# Patient Record
Sex: FEMALE | Race: ASIAN | Employment: FULL TIME | URBAN - METROPOLITAN AREA
[De-identification: names, ages, dates, MRNs, and addresses within clinical notes are randomized per-mention and may not be internally consistent; named-entity substitution may affect disease eponyms.]

---

## 2017-03-22 ENCOUNTER — OFFICE VISIT (OUTPATIENT)
Dept: INTERNAL MEDICINE CLINIC | Facility: CLINIC | Age: 52
End: 2017-03-22

## 2017-03-22 VITALS
HEART RATE: 60 BPM | BODY MASS INDEX: 28.16 KG/M2 | HEIGHT: 62 IN | TEMPERATURE: 98 F | SYSTOLIC BLOOD PRESSURE: 136 MMHG | DIASTOLIC BLOOD PRESSURE: 87 MMHG | WEIGHT: 153 LBS

## 2017-03-22 DIAGNOSIS — Z12.31 VISIT FOR SCREENING MAMMOGRAM: ICD-10-CM

## 2017-03-22 DIAGNOSIS — Z00.00 ROUTINE GENERAL MEDICAL EXAMINATION AT A HEALTH CARE FACILITY: Primary | ICD-10-CM

## 2017-03-22 DIAGNOSIS — L60.0 NAIL INGROWING: ICD-10-CM

## 2017-03-22 DIAGNOSIS — R32 URINARY INCONTINENCE, UNSPECIFIED TYPE: ICD-10-CM

## 2017-03-22 DIAGNOSIS — Z12.11 SCREENING FOR COLON CANCER: ICD-10-CM

## 2017-03-22 PROCEDURE — 99396 PREV VISIT EST AGE 40-64: CPT | Performed by: INTERNAL MEDICINE

## 2017-03-27 NOTE — PROGRESS NOTES
HPI:    Patient ID: Lizeth Morrison is a 46year old female. HPI      Phyisical exam  Last pap done in October 2016 per patient by another Doctor.  Last mammogram done 10/14/16    /87 mmHg  Pulse 60  Temp(Src) 97.8 °F (36.6 °C) (Oral)  Ht 5' 2\" (1 Does not bruise/bleed easily. Psychiatric/Behavioral: Negative for behavioral problems and agitation. No current outpatient prescriptions on file.   Allergies:No Known Allergies    HISTORY:  Past Medical History   Diagnosis Date   • Hypothyroid ASSESSMENT/PLAN:   (Z00.00) Routine general medical examination at a health care facility  (primary encounter diagnosis)  Plan: PODIATRY - INTERNAL, ASSAY, THYROID STIM         HORMONE, CBC, PLATELET; NO DIFFERENTIAL, COMP         METABOLIC PANEL (14), F

## 2017-03-29 ENCOUNTER — OFFICE VISIT (OUTPATIENT)
Dept: PODIATRY CLINIC | Facility: CLINIC | Age: 52
End: 2017-03-29

## 2017-03-29 DIAGNOSIS — L60.0 INGROWN LEFT BIG TOENAIL: Primary | ICD-10-CM

## 2017-03-29 PROCEDURE — 99212 OFFICE O/P EST SF 10 MIN: CPT | Performed by: PODIATRIST

## 2017-03-29 PROCEDURE — 99242 OFF/OP CONSLTJ NEW/EST SF 20: CPT | Performed by: PODIATRIST

## 2017-03-29 NOTE — PROGRESS NOTES
HPI:    Patient ID: Jensen Gooden is a 46year old female. HPI  This pleasant 49-year-old female presents as a new patient to me on consult from . Patient's chief concern is ingrown toenail on the left hallux.   Patient states for a week or

## 2017-07-03 ENCOUNTER — APPOINTMENT (OUTPATIENT)
Dept: OCCUPATIONAL MEDICINE | Age: 52
End: 2017-07-03
Attending: EMERGENCY MEDICINE

## 2017-08-28 ENCOUNTER — APPOINTMENT (OUTPATIENT)
Dept: OCCUPATIONAL MEDICINE | Age: 52
End: 2017-08-28
Attending: EMERGENCY MEDICINE

## 2017-09-27 ENCOUNTER — OFFICE VISIT (OUTPATIENT)
Dept: INTERNAL MEDICINE CLINIC | Facility: CLINIC | Age: 52
End: 2017-09-27

## 2017-09-27 VITALS
DIASTOLIC BLOOD PRESSURE: 69 MMHG | HEART RATE: 65 BPM | BODY MASS INDEX: 28.16 KG/M2 | WEIGHT: 153 LBS | HEIGHT: 62 IN | SYSTOLIC BLOOD PRESSURE: 113 MMHG

## 2017-09-27 DIAGNOSIS — M79.672 FOOT PAIN, LEFT: Primary | ICD-10-CM

## 2017-09-27 PROCEDURE — 99212 OFFICE O/P EST SF 10 MIN: CPT | Performed by: INTERNAL MEDICINE

## 2017-09-27 PROCEDURE — 99213 OFFICE O/P EST LOW 20 MIN: CPT | Performed by: INTERNAL MEDICINE

## 2017-10-04 NOTE — PROGRESS NOTES
HPI:    Patient ID: Mariam Hernandez is a 45year old female.     HPI  Left foot  pain and burning    Request lab    /69 (BP Location: Left arm, Patient Position: Sitting, Cuff Size: adult)   Pulse 65   Ht 5' 2\" (1.575 m)   Wt 153 lb (69.4 kg)   BMI 2 (CPT=73630), PODIATRY - INTERNAL  Proper shoes with support  Rest    Prn pain meds otc  Patient voiced understanding  and agrees with plan    Pt has labs ordered 3/2017  My proceed with labs      Meds This Visit:  No prescriptions requested or ordered

## 2017-10-07 ENCOUNTER — HOSPITAL ENCOUNTER (OUTPATIENT)
Dept: GENERAL RADIOLOGY | Age: 52
Discharge: HOME | End: 2017-10-07
Attending: INTERNAL MEDICINE
Payer: MEDICAID

## 2017-10-07 ENCOUNTER — APPOINTMENT (OUTPATIENT)
Dept: LAB | Age: 52
End: 2017-10-07
Attending: INTERNAL MEDICINE
Payer: MEDICAID

## 2017-10-07 DIAGNOSIS — M79.672 FOOT PAIN, LEFT: ICD-10-CM

## 2017-10-07 DIAGNOSIS — Z00.00 ROUTINE GENERAL MEDICAL EXAMINATION AT A HEALTH CARE FACILITY: ICD-10-CM

## 2017-10-07 PROCEDURE — 73630 X-RAY EXAM OF FOOT: CPT | Performed by: INTERNAL MEDICINE

## 2017-10-07 PROCEDURE — 80053 COMPREHEN METABOLIC PANEL: CPT

## 2017-10-07 PROCEDURE — 84439 ASSAY OF FREE THYROXINE: CPT

## 2017-10-07 PROCEDURE — 80061 LIPID PANEL: CPT

## 2017-10-07 PROCEDURE — 85027 COMPLETE CBC AUTOMATED: CPT

## 2017-10-07 PROCEDURE — 36415 COLL VENOUS BLD VENIPUNCTURE: CPT

## 2017-10-07 PROCEDURE — 81003 URINALYSIS AUTO W/O SCOPE: CPT

## 2017-10-07 PROCEDURE — 84443 ASSAY THYROID STIM HORMONE: CPT

## 2017-10-11 DIAGNOSIS — E03.8 OTHER SPECIFIED HYPOTHYROIDISM: Primary | ICD-10-CM

## 2018-09-12 ENCOUNTER — LAB ENCOUNTER (OUTPATIENT)
Dept: LAB | Facility: HOSPITAL | Age: 53
End: 2018-09-12
Attending: PHYSICIAN ASSISTANT
Payer: COMMERCIAL

## 2018-09-12 ENCOUNTER — OFFICE VISIT (OUTPATIENT)
Dept: INTERNAL MEDICINE CLINIC | Facility: CLINIC | Age: 53
End: 2018-09-12

## 2018-09-12 VITALS
BODY MASS INDEX: 28.65 KG/M2 | DIASTOLIC BLOOD PRESSURE: 74 MMHG | HEIGHT: 62 IN | HEART RATE: 66 BPM | SYSTOLIC BLOOD PRESSURE: 126 MMHG | WEIGHT: 155.69 LBS

## 2018-09-12 DIAGNOSIS — R79.89 ABNORMAL TSH: ICD-10-CM

## 2018-09-12 DIAGNOSIS — N39.3 STRESS INCONTINENCE OF URINE: ICD-10-CM

## 2018-09-12 DIAGNOSIS — Z00.00 ROUTINE PHYSICAL EXAMINATION: Primary | ICD-10-CM

## 2018-09-12 DIAGNOSIS — Z12.31 VISIT FOR SCREENING MAMMOGRAM: ICD-10-CM

## 2018-09-12 DIAGNOSIS — Z12.11 COLON CANCER SCREENING: ICD-10-CM

## 2018-09-12 DIAGNOSIS — Z00.00 ROUTINE PHYSICAL EXAMINATION: ICD-10-CM

## 2018-09-12 LAB
ALBUMIN SERPL BCP-MCNC: 4 G/DL (ref 3.5–4.8)
ALBUMIN/GLOB SERPL: 1.1 {RATIO} (ref 1–2)
ALP SERPL-CCNC: 47 U/L (ref 32–100)
ALT SERPL-CCNC: 35 U/L (ref 14–54)
ANION GAP SERPL CALC-SCNC: 8 MMOL/L (ref 0–18)
AST SERPL-CCNC: 34 U/L (ref 15–41)
BASOPHILS # BLD: 0 K/UL (ref 0–0.2)
BASOPHILS NFR BLD: 1 %
BILIRUB SERPL-MCNC: 0.7 MG/DL (ref 0.3–1.2)
BUN SERPL-MCNC: 10 MG/DL (ref 8–20)
BUN/CREAT SERPL: 17.2 (ref 10–20)
CALCIUM SERPL-MCNC: 9.6 MG/DL (ref 8.5–10.5)
CHLORIDE SERPL-SCNC: 103 MMOL/L (ref 95–110)
CHOLEST SERPL-MCNC: 170 MG/DL (ref 110–200)
CO2 SERPL-SCNC: 26 MMOL/L (ref 22–32)
CREAT SERPL-MCNC: 0.58 MG/DL (ref 0.5–1.5)
EOSINOPHIL # BLD: 0.2 K/UL (ref 0–0.7)
EOSINOPHIL NFR BLD: 3 %
ERYTHROCYTE [DISTWIDTH] IN BLOOD BY AUTOMATED COUNT: 12.4 % (ref 11–15)
GLOBULIN PLAS-MCNC: 3.7 G/DL (ref 2.5–3.7)
GLUCOSE SERPL-MCNC: 87 MG/DL (ref 70–99)
HCT VFR BLD AUTO: 39.7 % (ref 35–48)
HDLC SERPL-MCNC: 63 MG/DL
HGB BLD-MCNC: 13.2 G/DL (ref 12–16)
LDLC SERPL CALC-MCNC: 98 MG/DL (ref 0–99)
LYMPHOCYTES # BLD: 2.5 K/UL (ref 1–4)
LYMPHOCYTES NFR BLD: 54 %
MCH RBC QN AUTO: 29.7 PG (ref 27–32)
MCHC RBC AUTO-ENTMCNC: 33.3 G/DL (ref 32–37)
MCV RBC AUTO: 89.1 FL (ref 80–100)
MONOCYTES # BLD: 0.4 K/UL (ref 0–1)
MONOCYTES NFR BLD: 8 %
NEUTROPHILS # BLD AUTO: 1.6 K/UL (ref 1.8–7.7)
NEUTROPHILS NFR BLD: 34 %
NONHDLC SERPL-MCNC: 107 MG/DL
OSMOLALITY UR CALC.SUM OF ELEC: 282 MOSM/KG (ref 275–295)
PATIENT FASTING: YES
PLATELET # BLD AUTO: 162 K/UL (ref 140–400)
PMV BLD AUTO: 11.2 FL (ref 7.4–10.3)
POTASSIUM SERPL-SCNC: 3.5 MMOL/L (ref 3.3–5.1)
PROT SERPL-MCNC: 7.7 G/DL (ref 5.9–8.4)
RBC # BLD AUTO: 4.45 M/UL (ref 3.7–5.4)
SODIUM SERPL-SCNC: 137 MMOL/L (ref 136–144)
T3FREE SERPL-MCNC: 3.95 PG/ML (ref 2.53–4.29)
T4 FREE SERPL-MCNC: 1.24 NG/DL (ref 0.58–1.64)
TRIGL SERPL-MCNC: 46 MG/DL (ref 1–149)
TSH SERPL-ACNC: 0.05 UIU/ML (ref 0.45–5.33)
WBC # BLD AUTO: 4.6 K/UL (ref 4–11)

## 2018-09-12 PROCEDURE — 84481 FREE ASSAY (FT-3): CPT

## 2018-09-12 PROCEDURE — 36415 COLL VENOUS BLD VENIPUNCTURE: CPT

## 2018-09-12 PROCEDURE — 84439 ASSAY OF FREE THYROXINE: CPT

## 2018-09-12 PROCEDURE — 80053 COMPREHEN METABOLIC PANEL: CPT

## 2018-09-12 PROCEDURE — 84443 ASSAY THYROID STIM HORMONE: CPT

## 2018-09-12 PROCEDURE — 85025 COMPLETE CBC W/AUTO DIFF WBC: CPT

## 2018-09-12 PROCEDURE — 99396 PREV VISIT EST AGE 40-64: CPT | Performed by: PHYSICIAN ASSISTANT

## 2018-09-12 PROCEDURE — 80061 LIPID PANEL: CPT

## 2018-09-12 NOTE — PROGRESS NOTES
HPI:    Patient ID: Sinan Judge is a 52year old female. HPI   Patient presents today requesting physical exam.  Last physical exam was March 2017.   She had a full set of labs completed 10/7/17 which show blood count with WBC 3.8 but otherwise unrem Constitutional: She is oriented to person, place, and time. She appears well-developed and well-nourished. HENT:   Head: Normocephalic and atraumatic.    Right Ear: Tympanic membrane and external ear normal.   Left Ear: Tympanic membrane and external ea discussed. Likely due for Tdap vaccination and agrees to think about having this done soon. Encouraged to get annual flu vaccine. Will complete forms for work once labs resulted. - CBC WITH DIFFERENTIAL WITH PLATELET;  Future  - COMP METABOLIC PANEL (14 Imaging & Referrals:  PHYSICAL THERAPY - INTERNAL  EPI SCREENING BILAT (CPT=77067)         ET#[id number]

## 2018-12-05 ENCOUNTER — HOSPITAL ENCOUNTER (OUTPATIENT)
Dept: MAMMOGRAPHY | Age: 53
Discharge: HOME | End: 2018-12-05
Attending: PHYSICIAN ASSISTANT
Payer: COMMERCIAL

## 2018-12-05 DIAGNOSIS — Z12.31 VISIT FOR SCREENING MAMMOGRAM: ICD-10-CM

## 2018-12-05 PROCEDURE — 77067 SCR MAMMO BI INCL CAD: CPT | Performed by: PHYSICIAN ASSISTANT

## 2018-12-05 PROCEDURE — 77063 BREAST TOMOSYNTHESIS BI: CPT | Performed by: PHYSICIAN ASSISTANT

## 2019-02-05 ENCOUNTER — HOSPITAL ENCOUNTER (OUTPATIENT)
Dept: MAMMOGRAPHY | Facility: HOSPITAL | Age: 54
Discharge: HOME | End: 2019-02-05
Attending: PHYSICIAN ASSISTANT
Payer: COMMERCIAL

## 2019-02-05 ENCOUNTER — HOSPITAL ENCOUNTER (OUTPATIENT)
Dept: ULTRASOUND IMAGING | Facility: HOSPITAL | Age: 54
Discharge: HOME | End: 2019-02-05
Attending: PHYSICIAN ASSISTANT
Payer: COMMERCIAL

## 2019-02-05 DIAGNOSIS — R92.8 ABNORMAL MAMMOGRAM: ICD-10-CM

## 2019-02-05 PROCEDURE — 77065 DX MAMMO INCL CAD UNI: CPT | Performed by: PHYSICIAN ASSISTANT

## 2019-02-05 PROCEDURE — 76642 ULTRASOUND BREAST LIMITED: CPT | Performed by: PHYSICIAN ASSISTANT

## 2019-02-05 PROCEDURE — 77061 BREAST TOMOSYNTHESIS UNI: CPT | Performed by: PHYSICIAN ASSISTANT

## 2019-10-25 ENCOUNTER — TELEPHONE (OUTPATIENT)
Dept: CASE MANAGEMENT | Age: 54
End: 2019-10-25

## 2019-10-25 NOTE — TELEPHONE ENCOUNTER
Patient is due for physical, cervical and colorectal cancer screening. Left message to call back [phone].

## 2019-12-16 ENCOUNTER — OFFICE VISIT (OUTPATIENT)
Dept: INTERNAL MEDICINE CLINIC | Facility: CLINIC | Age: 54
End: 2019-12-16

## 2019-12-16 VITALS
BODY MASS INDEX: 27.97 KG/M2 | DIASTOLIC BLOOD PRESSURE: 66 MMHG | SYSTOLIC BLOOD PRESSURE: 114 MMHG | HEART RATE: 69 BPM | HEIGHT: 62 IN | WEIGHT: 152 LBS | TEMPERATURE: 98 F

## 2019-12-16 DIAGNOSIS — Z00.00 ROUTINE PHYSICAL EXAMINATION: Primary | ICD-10-CM

## 2019-12-16 DIAGNOSIS — Z12.31 VISIT FOR SCREENING MAMMOGRAM: ICD-10-CM

## 2019-12-16 DIAGNOSIS — Z12.11 COLON CANCER SCREENING: ICD-10-CM

## 2019-12-16 PROCEDURE — 99396 PREV VISIT EST AGE 40-64: CPT | Performed by: INTERNAL MEDICINE

## 2019-12-16 PROCEDURE — 90686 IIV4 VACC NO PRSV 0.5 ML IM: CPT | Performed by: INTERNAL MEDICINE

## 2019-12-16 PROCEDURE — 90471 IMMUNIZATION ADMIN: CPT | Performed by: INTERNAL MEDICINE

## 2019-12-19 ENCOUNTER — APPOINTMENT (OUTPATIENT)
Dept: LAB | Age: 54
End: 2019-12-19
Attending: INTERNAL MEDICINE
Payer: COMMERCIAL

## 2019-12-19 DIAGNOSIS — Z00.00 ROUTINE PHYSICAL EXAMINATION: ICD-10-CM

## 2019-12-19 PROCEDURE — 36415 COLL VENOUS BLD VENIPUNCTURE: CPT

## 2019-12-19 PROCEDURE — 84443 ASSAY THYROID STIM HORMONE: CPT

## 2019-12-19 PROCEDURE — 81015 MICROSCOPIC EXAM OF URINE: CPT

## 2019-12-19 PROCEDURE — 80053 COMPREHEN METABOLIC PANEL: CPT

## 2019-12-19 PROCEDURE — 85027 COMPLETE CBC AUTOMATED: CPT

## 2019-12-19 PROCEDURE — 84439 ASSAY OF FREE THYROXINE: CPT

## 2019-12-19 PROCEDURE — 84480 ASSAY TRIIODOTHYRONINE (T3): CPT

## 2019-12-19 PROCEDURE — 83036 HEMOGLOBIN GLYCOSYLATED A1C: CPT

## 2019-12-19 PROCEDURE — 80061 LIPID PANEL: CPT

## 2019-12-27 NOTE — PROGRESS NOTES
HPI:    Patient ID: Geovanny Morton is a 62year old female.     HPI     Physical exam  Generally healthy    /66 (BP Location: Right arm, Patient Position: Sitting, Cuff Size: adult)   Pulse 69   Temp 98.1 °F (36.7 °C) (Oral)   Ht 5' 2\" (1.575 m)   Wt Performed by Janes Antonio MD at [place] [address]      Family History   Problem Relation Age of Onset   • Diabetes Father       Social History: Social History    Tobacco Use      Smoking status: Never Smoker      Smokeless tobacco: Never Used    Alcohol us URINE MICROSCOPIC W REFLEX         CULTURE, TRIIODOTHYRONINE (T3) TOTAL        Routine pap advised    (Z12.31) Visit for screening mammogram  Plan: [facility] 2D+3D SCREENING BILAT         (CPT=77067/25460)        Self breast exam advised    (Z12.11) Colon c

## 2020-01-13 ENCOUNTER — OFFICE VISIT (OUTPATIENT)
Dept: INTERNAL MEDICINE CLINIC | Facility: CLINIC | Age: 55
End: 2020-01-13
Payer: COMMERCIAL

## 2020-01-13 VITALS
SYSTOLIC BLOOD PRESSURE: 131 MMHG | HEART RATE: 65 BPM | WEIGHT: 152 LBS | TEMPERATURE: 98 F | DIASTOLIC BLOOD PRESSURE: 77 MMHG | BODY MASS INDEX: 27.97 KG/M2 | HEIGHT: 62 IN

## 2020-01-13 DIAGNOSIS — E78.5 HYPERLIPIDEMIA, UNSPECIFIED HYPERLIPIDEMIA TYPE: ICD-10-CM

## 2020-01-13 DIAGNOSIS — E55.9 VITAMIN D DEFICIENCY: ICD-10-CM

## 2020-01-13 DIAGNOSIS — E05.90 HYPERTHYROIDISM: Primary | ICD-10-CM

## 2020-01-13 DIAGNOSIS — R73.01 ABNORMAL FASTING GLUCOSE: ICD-10-CM

## 2020-01-13 PROCEDURE — 99214 OFFICE O/P EST MOD 30 MIN: CPT | Performed by: INTERNAL MEDICINE

## 2020-01-13 RX ORDER — METHIMAZOLE 5 MG/1
5 TABLET ORAL 2 TIMES DAILY
Qty: 180 TABLET | Refills: 0 | Status: SHIPPED | OUTPATIENT
Start: 2020-01-13 | End: 2020-04-05

## 2020-01-13 NOTE — PATIENT INSTRUCTIONS
Component      Latest Ref Rng & Units 12/19/2019 12/19/2019           8:52 AM  8:52 AM   Glucose      70 - 99 mg/dL 114 (H)    Sodium      136 - 145 mmol/L 141    Potassium      3.5 - 5.1 mmol/L 3.8    Chloride      98 - 112 mmol/L 109    Carbon Dioxide, T

## 2020-01-25 NOTE — PROGRESS NOTES
HPI:    Patient ID: Rosita Apgar is a 62year old female.     HPI  Discuss labs she denies having any specific complaints    Denies palpitations chest pain PND orthopnea or leg edema    /77 (BP Location: Right arm, Patient Position: Sitting, Cuff Siz Allergies:No Known Allergies    HISTORY:  Past Medical History:   Diagnosis Date   • Hypothyroid    • Valvular disease       Past Surgical History:   Procedure Laterality Date   • ARTHROSCOPY LEFT KNEE W/ MEDIAL MENISCECTOMY Left 3/18/2015    Performed 89.4   MCH      26.0 - 34.0 pg  29.9   MCHC      31.0 - 37.0 g/dL  33.4   RDW      11.0 - 15.0 %  12.0   RDW-SD      35.1 - 46.3 fL  38.8   Platelet Count      181.1 - 450.0 10(3)uL  178.0   Cholesterol, Total      <200 mg/dL  174   HDL Cholesterol      4 [E]      Hemoglobin A1C      Basic Metabolic Panel (8) [E]      Vit D total      Meds This Visit:  Requested Prescriptions     Signed Prescriptions Disp Refills   • methimazole (TAPAZOLE) 5 MG Oral Tab 180 tablet 0     Sig: Take 1 tablet (5 mg total) by mo

## 2020-04-05 RX ORDER — METHIMAZOLE 5 MG/1
TABLET ORAL
Qty: 180 TABLET | Refills: 0 | Status: SHIPPED | OUTPATIENT
Start: 2020-04-05 | End: 2020-07-02

## 2020-04-30 ENCOUNTER — TELEPHONE (OUTPATIENT)
Dept: ENDOCRINOLOGY CLINIC | Facility: CLINIC | Age: 55
End: 2020-04-30

## 2020-05-13 ENCOUNTER — TELEMEDICINE (OUTPATIENT)
Dept: ENDOCRINOLOGY CLINIC | Facility: CLINIC | Age: 55
End: 2020-05-13
Payer: COMMERCIAL

## 2020-05-13 DIAGNOSIS — E05.90 SUBCLINICAL HYPERTHYROIDISM: Primary | ICD-10-CM

## 2020-05-13 PROCEDURE — 99203 OFFICE O/P NEW LOW 30 MIN: CPT | Performed by: INTERNAL MEDICINE

## 2020-05-13 NOTE — PROGRESS NOTES
Name: Lissett Croft  Date: 5/13/2020    Referring Physician: Jayla De Jesus    CC: Hyperthyroidism     HISTORY OF PRESENT ILLNESS   Lissett Croft is a 62year old female who presents for hyperthyroidism.     63 y/o F presents for initial evaluation of subcl on file      Years of education: Not on file      Highest education level: Not on file    Tobacco Use      Smoking status: Never Smoker      Smokeless tobacco: Never Used    Substance and Sexual Activity      Alcohol use: No      Drug use: No    Other Topi was taken to allow for sufficient and adequate time. This billing was spent on reviewing labs, medications, radiology tests and decision making. Appropriate medical decision-making and tests are ordered as detailed in the plan of care above.

## 2020-07-02 RX ORDER — METHIMAZOLE 5 MG/1
TABLET ORAL
Qty: 180 TABLET | Refills: 0 | Status: SHIPPED | OUTPATIENT
Start: 2020-07-02 | End: 2020-10-02

## 2020-10-02 RX ORDER — METHIMAZOLE 5 MG/1
TABLET ORAL
Qty: 180 TABLET | Refills: 0 | Status: SHIPPED | OUTPATIENT
Start: 2020-10-02 | End: 2021-06-20

## 2020-12-19 ENCOUNTER — HOSPITAL ENCOUNTER (OUTPATIENT)
Dept: GENERAL RADIOLOGY | Age: 55
Discharge: HOME | End: 2020-12-19
Attending: INTERNAL MEDICINE
Payer: COMMERCIAL

## 2020-12-19 ENCOUNTER — OFFICE VISIT (OUTPATIENT)
Dept: INTERNAL MEDICINE CLINIC | Facility: CLINIC | Age: 55
End: 2020-12-19
Payer: COMMERCIAL

## 2020-12-19 VITALS
WEIGHT: 150 LBS | BODY MASS INDEX: 27.6 KG/M2 | HEART RATE: 73 BPM | SYSTOLIC BLOOD PRESSURE: 129 MMHG | HEIGHT: 62 IN | DIASTOLIC BLOOD PRESSURE: 74 MMHG

## 2020-12-19 DIAGNOSIS — Z23 NEED FOR VACCINATION: ICD-10-CM

## 2020-12-19 DIAGNOSIS — M79.605 LEFT LEG PAIN: ICD-10-CM

## 2020-12-19 DIAGNOSIS — M79.605 LEFT LEG PAIN: Primary | ICD-10-CM

## 2020-12-19 PROCEDURE — 3074F SYST BP LT 130 MM HG: CPT | Performed by: INTERNAL MEDICINE

## 2020-12-19 PROCEDURE — 90471 IMMUNIZATION ADMIN: CPT | Performed by: INTERNAL MEDICINE

## 2020-12-19 PROCEDURE — 73502 X-RAY EXAM HIP UNI 2-3 VIEWS: CPT | Performed by: INTERNAL MEDICINE

## 2020-12-19 PROCEDURE — 90686 IIV4 VACC NO PRSV 0.5 ML IM: CPT | Performed by: INTERNAL MEDICINE

## 2020-12-19 PROCEDURE — 3078F DIAST BP <80 MM HG: CPT | Performed by: INTERNAL MEDICINE

## 2020-12-19 PROCEDURE — 99213 OFFICE O/P EST LOW 20 MIN: CPT | Performed by: INTERNAL MEDICINE

## 2020-12-19 PROCEDURE — 3008F BODY MASS INDEX DOCD: CPT | Performed by: INTERNAL MEDICINE

## 2020-12-19 PROCEDURE — 72110 X-RAY EXAM L-2 SPINE 4/>VWS: CPT | Performed by: INTERNAL MEDICINE

## 2020-12-19 RX ORDER — MELOXICAM 7.5 MG/1
7.5 TABLET ORAL DAILY
Qty: 30 TABLET | Refills: 0 | Status: SHIPPED | OUTPATIENT
Start: 2020-12-19 | End: 2021-01-11

## 2020-12-19 NOTE — PROGRESS NOTES
Patient ID: Venu Calhoun is a 60year old female.   Patient presents with:  Leg Pain: LT leg pain, difficulty walking,  pain starts from waist radiating down to foot, unable to bend down, swelling at times,  takes advil for pain, had same issue in past si education: Not on file      Highest education level: Not on file    Occupational History      Not on file    Social Needs      Financial resource strain: Not on file      Food insecurity        Worry: Not on file        Inability: Not on file      Transpor well-nourished. Cardiovascular: Normal rate, regular rhythm and normal heart sounds. Pulmonary/Chest: Effort normal and breath sounds normal. No respiratory distress. She has no wheezes. She has no rales. Abdominal: Soft.  Bowel sounds are normal. She

## 2020-12-28 ENCOUNTER — OFFICE VISIT (OUTPATIENT)
Dept: INTERNAL MEDICINE CLINIC | Facility: CLINIC | Age: 55
End: 2020-12-28
Payer: COMMERCIAL

## 2020-12-28 VITALS
WEIGHT: 150 LBS | HEIGHT: 62 IN | BODY MASS INDEX: 27.6 KG/M2 | HEART RATE: 80 BPM | DIASTOLIC BLOOD PRESSURE: 83 MMHG | SYSTOLIC BLOOD PRESSURE: 158 MMHG

## 2020-12-28 DIAGNOSIS — M48.061 NEURAL FORAMINAL STENOSIS OF LUMBAR SPINE: ICD-10-CM

## 2020-12-28 DIAGNOSIS — R03.0 ELEVATED BLOOD PRESSURE READING: ICD-10-CM

## 2020-12-28 DIAGNOSIS — M79.605 LEFT LEG PAIN: Primary | ICD-10-CM

## 2020-12-28 PROCEDURE — 3008F BODY MASS INDEX DOCD: CPT | Performed by: INTERNAL MEDICINE

## 2020-12-28 PROCEDURE — 99214 OFFICE O/P EST MOD 30 MIN: CPT | Performed by: INTERNAL MEDICINE

## 2020-12-28 PROCEDURE — 3077F SYST BP >= 140 MM HG: CPT | Performed by: INTERNAL MEDICINE

## 2020-12-28 PROCEDURE — 3079F DIAST BP 80-89 MM HG: CPT | Performed by: INTERNAL MEDICINE

## 2020-12-28 NOTE — PROGRESS NOTES
Patient ID: Blaine Marina is a 60year old female. Patient presents with:  Leg Pain: 1 week f/u        HISTORY OF PRESENT ILLNESS:   HPI  Patient presents for above. Here for follow-up of left lower leg pain.   On previous visit she was given Mobic and t Use      Smoking status: Never Smoker      Smokeless tobacco: Never Used    Substance and Sexual Activity      Alcohol use: No      Drug use: No      Sexual activity: Not on file    Lifestyle      Physical activity        Days per week: Not on file There is no rebound. Neurological: She is alert. She has normal reflexes. No sensory deficit. She exhibits abnormal muscle tone. Psychiatric: She has a normal mood and affect.  Her behavior is normal.     XR Lumbar Spine (12/19/2020):  CONCLUSION: Disc

## 2021-01-04 ENCOUNTER — TELEPHONE (OUTPATIENT)
Dept: PHYSICAL THERAPY | Facility: HOSPITAL | Age: 56
End: 2021-01-04

## 2021-01-04 ENCOUNTER — TELEPHONE (OUTPATIENT)
Dept: INTERNAL MEDICINE CLINIC | Facility: CLINIC | Age: 56
End: 2021-01-04

## 2021-01-08 ENCOUNTER — TELEPHONE (OUTPATIENT)
Dept: ADMINISTRATIVE | Age: 56
End: 2021-01-08

## 2021-01-11 DIAGNOSIS — M79.605 LEFT LEG PAIN: ICD-10-CM

## 2021-01-11 RX ORDER — MELOXICAM 7.5 MG/1
TABLET ORAL
Qty: 30 TABLET | Refills: 0 | Status: SHIPPED | OUTPATIENT
Start: 2021-01-11 | End: 2021-03-09 | Stop reason: ALTCHOICE

## 2021-01-12 ENCOUNTER — OFFICE VISIT (OUTPATIENT)
Dept: PHYSICAL THERAPY | Age: 56
End: 2021-01-12
Attending: INTERNAL MEDICINE
Payer: COMMERCIAL

## 2021-01-12 DIAGNOSIS — M79.605 LEFT LEG PAIN: ICD-10-CM

## 2021-01-12 DIAGNOSIS — M48.061 NEURAL FORAMINAL STENOSIS OF LUMBAR SPINE: ICD-10-CM

## 2021-01-12 PROCEDURE — 97110 THERAPEUTIC EXERCISES: CPT

## 2021-01-12 PROCEDURE — 97161 PT EVAL LOW COMPLEX 20 MIN: CPT

## 2021-01-12 NOTE — PROGRESS NOTES
P.T. EVALUATION:   Referring Physician: Dr. Christina Durán  Diagnosis: Left leg pain (M79.605)  Neural foraminal stenosis of lumbar spine (M99.73)     Date of Onset:  December 2020 Date of Service: 1/12/2021     PATIENT SUMMARY   Lissett Sprain is a 60year old y/o Lumbar ROM:  Flx: Min loss (pain LLE)  Ext: Min loss (NE)  Rot:R min loss (back pain), L WNL (LLE pain)  Lat flx: R min loss (NE) , L mod loss (NE)    Accessory motion:   PA assessment:  L1-L2: severe pain  L3-4: moderate pain  L5-Sl: minimal pain    Str certification required: Yes  I certify the need for these services furnished under this plan of treatment and while under my care.     X___________________________________________________ Date____________________    Certification From: 4/61/6542  To:4/12/20

## 2021-01-14 ENCOUNTER — OFFICE VISIT (OUTPATIENT)
Dept: PHYSICAL THERAPY | Age: 56
End: 2021-01-14
Attending: INTERNAL MEDICINE
Payer: COMMERCIAL

## 2021-01-14 PROCEDURE — 97110 THERAPEUTIC EXERCISES: CPT

## 2021-01-14 NOTE — TELEPHONE ENCOUNTER
Left message that HIPAA or [facility] consent is needed before requested office notes can be faxed to [address]

## 2021-01-14 NOTE — PROGRESS NOTES
Diagnosis:  Left leg pain (M79.605)  Neural foraminal stenosis of lumbar spine (M99.73)        Next MD visit: none scheduled  Fall Risk: standard         Precautions: n/a          Medication Changes since last visit?: No     Subjective: Pt reports no back

## 2021-01-18 ENCOUNTER — OFFICE VISIT (OUTPATIENT)
Dept: PHYSICAL THERAPY | Age: 56
End: 2021-01-18
Payer: COMMERCIAL

## 2021-01-18 PROCEDURE — 97110 THERAPEUTIC EXERCISES: CPT

## 2021-01-18 NOTE — PROGRESS NOTES
Diagnosis:  Left leg pain (M79.605)  Neural foraminal stenosis of lumbar spine (M99.73)        Next MD visit: none scheduled  Fall Risk: standard         Precautions: n/a          Medication Changes since last visit?: No     Subjective: Pt reports her back

## 2021-01-20 ENCOUNTER — APPOINTMENT (OUTPATIENT)
Dept: PHYSICAL THERAPY | Age: 56
End: 2021-01-20
Payer: COMMERCIAL

## 2021-01-25 ENCOUNTER — OFFICE VISIT (OUTPATIENT)
Dept: PHYSICAL THERAPY | Age: 56
End: 2021-01-25
Payer: COMMERCIAL

## 2021-01-25 PROCEDURE — 97110 THERAPEUTIC EXERCISES: CPT

## 2021-01-25 NOTE — PROGRESS NOTES
Diagnosis:  Left leg pain (M79.605)  Neural foraminal stenosis of lumbar spine (M99.73)        Next MD visit: none scheduled  Fall Risk: standard         Precautions: n/a          Medication Changes since last visit?: No     Subjective: Pt reports no curre lumbar extension 10x  - shuttle machine B knee extension/flexion with 4 bands 2x10  - standing lumbar extension SAG at wall 1x10   Manual Therapy     - prone lumbar spine PA joint mobilizations at T12-L5 spinous processes grade 2 x 6 minutes   Therapeutic

## 2021-01-26 ENCOUNTER — OFFICE VISIT (OUTPATIENT)
Dept: INTERNAL MEDICINE CLINIC | Facility: CLINIC | Age: 56
End: 2021-01-26
Payer: COMMERCIAL

## 2021-01-26 VITALS
HEART RATE: 77 BPM | BODY MASS INDEX: 28.71 KG/M2 | TEMPERATURE: 98 F | DIASTOLIC BLOOD PRESSURE: 77 MMHG | WEIGHT: 156 LBS | SYSTOLIC BLOOD PRESSURE: 133 MMHG | HEIGHT: 62 IN

## 2021-01-26 DIAGNOSIS — M79.605 LEFT LEG PAIN: Primary | ICD-10-CM

## 2021-01-26 DIAGNOSIS — M48.061 NEURAL FORAMINAL STENOSIS OF LUMBAR SPINE: ICD-10-CM

## 2021-01-26 PROCEDURE — 99213 OFFICE O/P EST LOW 20 MIN: CPT | Performed by: INTERNAL MEDICINE

## 2021-01-26 PROCEDURE — 3008F BODY MASS INDEX DOCD: CPT | Performed by: INTERNAL MEDICINE

## 2021-01-26 PROCEDURE — 3075F SYST BP GE 130 - 139MM HG: CPT | Performed by: INTERNAL MEDICINE

## 2021-01-26 PROCEDURE — 3078F DIAST BP <80 MM HG: CPT | Performed by: INTERNAL MEDICINE

## 2021-01-26 NOTE — PROGRESS NOTES
Patient ID: Tatiana Corey is a 60year old female. Patient presents with: Follow - Up: Pt reports leg pain getting better. HISTORY OF PRESENT ILLNESS:   HPI  Patient presents for above. Here for follow-up of left leg pain.   Has been doing physic Tobacco Use      Smoking status: Never Smoker      Smokeless tobacco: Never Used    Substance and Sexual Activity      Alcohol use: No      Drug use: No      Sexual activity: Not on file    Lifestyle      Physical activity        Days per week: Not on file Psychiatric: She has a normal mood and affect. Her behavior is normal.         ASSESSMENT/PLAN:   1. Left leg pain  · We will hold off on MRI per patient's request.  · Continue physical therapy. · Letter written for work restrictions.     2. Neural rich

## 2021-02-01 ENCOUNTER — OFFICE VISIT (OUTPATIENT)
Dept: PHYSICAL THERAPY | Age: 56
End: 2021-02-01
Payer: COMMERCIAL

## 2021-02-01 PROCEDURE — 97110 THERAPEUTIC EXERCISES: CPT

## 2021-02-01 NOTE — PROGRESS NOTES
Diagnosis:  Left leg pain (M79.605)  Neural foraminal stenosis of lumbar spine (M99.73)        Next MD visit: none scheduled  Fall Risk: standard         Precautions: n/a          Medication Changes since last visit?: No     Subjective: Pt reports her back extension/flexion with 4 bands 2x10  - standing lumbar extension SAG at wall 1x10  - standing B shoulder horizontal abd/add with YTB 2x10    - prone gluteal squeezes 20x 5 sec holds  - JOSELUIS 1 minute  - prone R/L hip extension 2x5 reps ea  - sidelying R/L hi

## 2021-02-02 NOTE — TELEPHONE ENCOUNTER
Called number on file, it is pt's spouse mobile number. Spouse gave me pt's phone number [phone]. Spoke to pt, made pt aware we are in need of consent either through [employer] message or completion of Hipaa.  Will send another [employer] message, pt states she bautista

## 2021-02-03 ENCOUNTER — APPOINTMENT (OUTPATIENT)
Dept: PHYSICAL THERAPY | Age: 56
End: 2021-02-03
Payer: COMMERCIAL

## 2021-02-08 ENCOUNTER — OFFICE VISIT (OUTPATIENT)
Dept: PHYSICAL THERAPY | Age: 56
End: 2021-02-08
Payer: COMMERCIAL

## 2021-02-08 PROCEDURE — 97110 THERAPEUTIC EXERCISES: CPT

## 2021-02-08 NOTE — PROGRESS NOTES
Physical Therapy Discharge Summary    Diagnosis:  Left leg pain (M79.605)  Neural foraminal stenosis of lumbar spine (M99.73)        Next MD visit: none scheduled  Fall Risk: standard         Precautions: n/a          Medication Changes since last visit?: sec holds  - standing lumbar extension 10x  - standing R/L hip abduction with RTB at ankles    with RTB - not today  - standing R/L hip extension with RTB at ankles - not today  - shuttle machine B knee extension/flexion with 4 bands 3x10  - shuttle mariela Modalities       -                Goals:  1- Pt will be I with maintenance and progression of HEP - MET  2- Pt will demo increase in lumbar ROM to WNL to ease ability for pt to bend - MET  3- Pt will report abolishment of BLE symptoms to be able to ambul

## 2021-03-09 ENCOUNTER — OFFICE VISIT (OUTPATIENT)
Dept: INTERNAL MEDICINE CLINIC | Facility: CLINIC | Age: 56
End: 2021-03-09
Payer: COMMERCIAL

## 2021-03-09 VITALS
SYSTOLIC BLOOD PRESSURE: 128 MMHG | HEART RATE: 82 BPM | WEIGHT: 156 LBS | HEIGHT: 62 IN | BODY MASS INDEX: 28.71 KG/M2 | DIASTOLIC BLOOD PRESSURE: 72 MMHG

## 2021-03-09 DIAGNOSIS — M79.605 LEFT LEG PAIN: Primary | ICD-10-CM

## 2021-03-09 DIAGNOSIS — R20.8 BURNING SENSATION OF FEET: ICD-10-CM

## 2021-03-09 PROCEDURE — 99214 OFFICE O/P EST MOD 30 MIN: CPT | Performed by: INTERNAL MEDICINE

## 2021-03-09 PROCEDURE — 3078F DIAST BP <80 MM HG: CPT | Performed by: INTERNAL MEDICINE

## 2021-03-09 PROCEDURE — 3074F SYST BP LT 130 MM HG: CPT | Performed by: INTERNAL MEDICINE

## 2021-03-09 PROCEDURE — 3008F BODY MASS INDEX DOCD: CPT | Performed by: INTERNAL MEDICINE

## 2021-03-09 NOTE — PROGRESS NOTES
Patient ID: Savanah Malave is a 60year old female. Patient presents with: Follow - Up: 6 week follow up on left leg pain, per pt pain is better       HISTORY OF PRESENT ILLNESS:   HPI  Patient presents for above. Here for multiple issues.     History of  Service: Not Asked        Blood Transfusions: Not Asked        Caffeine Concern: Yes          3 cups coffee/day        Occupational Exposure: Not Asked        Hobby Hazards: Not Asked        Sleep Concern: Not Asked        Stress Concern: N Abdominal:      General: Abdomen is flat. Bowel sounds are normal.   Musculoskeletal:        Feet:    Neurological:      General: No focal deficit present. Mental Status: She is alert. ASSESSMENT/PLAN:   1. Left leg pain  · Resolved.   ·

## 2021-06-20 RX ORDER — METHIMAZOLE 5 MG/1
5 TABLET ORAL 2 TIMES DAILY
Qty: 60 TABLET | Refills: 0 | Status: SHIPPED | OUTPATIENT
Start: 2021-06-20 | End: 2021-07-26

## 2021-07-26 RX ORDER — METHIMAZOLE 5 MG/1
TABLET ORAL
Qty: 60 TABLET | Refills: 0 | Status: SHIPPED | OUTPATIENT
Start: 2021-07-26 | End: 2021-08-12

## 2021-08-12 RX ORDER — METHIMAZOLE 5 MG/1
5 TABLET ORAL 2 TIMES DAILY
Qty: 60 TABLET | Refills: 0 | Status: SHIPPED | OUTPATIENT
Start: 2021-08-12 | End: 2021-08-13

## 2021-08-12 NOTE — TELEPHONE ENCOUNTER
Current Outpatient Medications:   •  METHIMAZOLE 5 MG Oral Tab, TAKE 1 TABLET BY MOUTH TWICE A DAY, Disp: 60 tablet, Rfl: 0

## 2021-08-13 NOTE — TELEPHONE ENCOUNTER
90 day supply      Current Outpatient Medications:   •  methimazole 5 MG Oral Tab, Take 1 tablet (5 mg total) by mouth 2 (two) times daily. , Disp: 60 tablet, Rfl: 0

## 2021-08-14 RX ORDER — METHIMAZOLE 5 MG/1
5 TABLET ORAL 2 TIMES DAILY
Qty: 180 TABLET | Refills: 1 | Status: SHIPPED | OUTPATIENT
Start: 2021-08-14

## 2021-10-25 ENCOUNTER — OFFICE VISIT (OUTPATIENT)
Dept: INTERNAL MEDICINE CLINIC | Facility: CLINIC | Age: 56
End: 2021-10-25
Payer: COMMERCIAL

## 2021-10-25 VITALS
BODY MASS INDEX: 28.89 KG/M2 | HEIGHT: 62 IN | WEIGHT: 157 LBS | OXYGEN SATURATION: 96 % | HEART RATE: 71 BPM | DIASTOLIC BLOOD PRESSURE: 78 MMHG | SYSTOLIC BLOOD PRESSURE: 122 MMHG

## 2021-10-25 DIAGNOSIS — G89.29 CHRONIC PAIN OF BOTH FEET: ICD-10-CM

## 2021-10-25 DIAGNOSIS — M79.671 CHRONIC PAIN OF BOTH FEET: ICD-10-CM

## 2021-10-25 DIAGNOSIS — M79.672 CHRONIC PAIN OF BOTH FEET: ICD-10-CM

## 2021-10-25 DIAGNOSIS — E05.90 SUBCLINICAL HYPERTHYROIDISM: ICD-10-CM

## 2021-10-25 DIAGNOSIS — M47.816 OSTEOARTHRITIS OF LUMBAR SPINE, UNSPECIFIED SPINAL OSTEOARTHRITIS COMPLICATION STATUS: ICD-10-CM

## 2021-10-25 DIAGNOSIS — Z23 NEED FOR INFLUENZA VACCINATION: ICD-10-CM

## 2021-10-25 DIAGNOSIS — Z12.11 SCREENING FOR COLON CANCER: ICD-10-CM

## 2021-10-25 DIAGNOSIS — L65.9 HAIR LOSS: ICD-10-CM

## 2021-10-25 DIAGNOSIS — R32 URINARY INCONTINENCE, UNSPECIFIED TYPE: ICD-10-CM

## 2021-10-25 DIAGNOSIS — I83.93 VARICOSE VEINS OF BOTH LOWER EXTREMITIES, UNSPECIFIED WHETHER COMPLICATED: ICD-10-CM

## 2021-10-25 DIAGNOSIS — Z12.31 ENCOUNTER FOR SCREENING MAMMOGRAM FOR MALIGNANT NEOPLASM OF BREAST: ICD-10-CM

## 2021-10-25 DIAGNOSIS — E04.9 ENLARGED THYROID: ICD-10-CM

## 2021-10-25 DIAGNOSIS — Z00.00 ANNUAL PHYSICAL EXAM: Primary | ICD-10-CM

## 2021-10-25 PROCEDURE — 3008F BODY MASS INDEX DOCD: CPT | Performed by: INTERNAL MEDICINE

## 2021-10-25 PROCEDURE — 3074F SYST BP LT 130 MM HG: CPT | Performed by: INTERNAL MEDICINE

## 2021-10-25 PROCEDURE — 99386 PREV VISIT NEW AGE 40-64: CPT | Performed by: INTERNAL MEDICINE

## 2021-10-25 PROCEDURE — 90471 IMMUNIZATION ADMIN: CPT | Performed by: INTERNAL MEDICINE

## 2021-10-25 PROCEDURE — 90686 IIV4 VACC NO PRSV 0.5 ML IM: CPT | Performed by: INTERNAL MEDICINE

## 2021-10-25 PROCEDURE — 3078F DIAST BP <80 MM HG: CPT | Performed by: INTERNAL MEDICINE

## 2021-10-25 RX ORDER — MELOXICAM 15 MG/1
15 TABLET ORAL DAILY PRN
Qty: 20 TABLET | Refills: 0 | Status: SHIPPED | OUTPATIENT
Start: 2021-10-25 | End: 2021-11-14

## 2021-10-25 NOTE — PATIENT INSTRUCTIONS
Kegel Exercises  Kegel exercises are done to help strengthen the muscles in your pelvic floor. You don’t need special clothing or equipment. They’re easy to learn and simple to do.  And if you do them right, no one can tell you’re doing them, so they can sit down. · Tighten your pelvic floor before you sneeze, get up from a chair, cough, laugh, or lift. This can help prevent urine, gas, or stool leakage. Nathan last reviewed this educational content on 8/1/2020  © 2459-3660 [employer].  A special weights that you place in your vagina may be recommended to help make your Kegels even more effective. Talk to your healthcare provider if you have trouble doing Kegel exercises.    Helpful tips  Here are some tips to follow:  · Do your Arlena Duane as of

## 2021-10-25 NOTE — PROGRESS NOTES
Blaine Marina is a 60year old female.     Chief complaint: annual physical exam       HPI:     Blaine Marina is a 60year old pleasant female who presents for annual physical exam     No chest pain no sob no abdominal pain  No diarrhea or constipation   No kg/m²   GENERAL: well developed, well nourished,in no apparent distress  SKIN: no rashes,no suspicious lesions  HEENT: atraumatic, normocephalic,ears and throat are clear  NECK: supple,no adenopathy  LUNGS: clear to auscultation  Breast : normal no lumps 20 tablet; Refill: 0  - FLULAVAL INFLUENZA VACCINE QUAD PRESERVATIVE FREE 0.5 ML    3.  Urinary incontinence, unspecified type  UA   Referral to urogyne   - CBC WITH DIFFERENTIAL WITH PLATELET  - COMP METABOLIC PANEL (14)  - FERRITIN  - LIPID PANEL  - VITAM PAP REFLEX HPV MRNA E6/E7  - GASTRO - INTERNAL  - EPI SCREENING BILAT (CPT=77067); Future  - URINALYSIS WITH CULTURE REFLEX  - US THYROID (NEO=86215); Future  - OP REFERRAL TO UROGYNECOLOGY CLINIC  - Meloxicam 15 MG Oral Tab;  Take 1 tablet (15 mg total) by 25-HYDROXY  - VITAMIN B12  - IRON AND TIBC  - ASSAY, THYROID STIM HORMONE  - FREE T4 (FREE THYROXINE)  - TRIIODOTHYRONINE (T3) TOTAL  - THINPREP TIS PAP REFLEX HPV MRNA E6/E7  - GASTRO - INTERNAL  - EPI SCREENING BILAT (CPT=77067);  Future  - URINALYSIS WIT WITH CULTURE REFLEX  - US THYROID (DFV=76514); Future  - OP REFERRAL TO UROGYNECOLOGY CLINIC  - Meloxicam 15 MG Oral Tab; Take 1 tablet (15 mg total) by mouth daily as needed for Pain. Dispense: 20 tablet;  Refill: 0  - FLULAVAL INFLUENZA VACCINE QUAD PRES

## 2021-10-26 PROBLEM — M47.816 OSTEOARTHRITIS OF LUMBAR SPINE: Status: ACTIVE | Noted: 2021-10-26

## 2021-10-26 PROBLEM — Z00.00 ANNUAL PHYSICAL EXAM: Status: ACTIVE | Noted: 2021-10-26

## 2021-10-26 PROBLEM — M79.671 CHRONIC PAIN OF BOTH FEET: Status: ACTIVE | Noted: 2021-10-26

## 2021-10-26 PROBLEM — G89.29 CHRONIC PAIN OF BOTH FEET: Status: ACTIVE | Noted: 2021-10-26

## 2021-10-26 PROBLEM — R32 URINARY INCONTINENCE: Status: ACTIVE | Noted: 2021-10-26

## 2021-10-26 PROBLEM — E05.90 SUBCLINICAL HYPERTHYROIDISM: Status: ACTIVE | Noted: 2021-10-26

## 2021-10-26 PROBLEM — I83.93 VARICOSE VEINS OF BOTH LOWER EXTREMITIES: Status: ACTIVE | Noted: 2021-10-26

## 2021-10-26 PROBLEM — M79.672 CHRONIC PAIN OF BOTH FEET: Status: ACTIVE | Noted: 2021-10-26

## 2021-10-30 ENCOUNTER — LAB ENCOUNTER (OUTPATIENT)
Dept: LAB | Age: 56
End: 2021-10-30
Attending: INTERNAL MEDICINE
Payer: COMMERCIAL

## 2021-10-30 PROCEDURE — 80053 COMPREHEN METABOLIC PANEL: CPT | Performed by: INTERNAL MEDICINE

## 2021-10-30 PROCEDURE — 87086 URINE CULTURE/COLONY COUNT: CPT | Performed by: INTERNAL MEDICINE

## 2021-10-30 PROCEDURE — 36415 COLL VENOUS BLD VENIPUNCTURE: CPT | Performed by: INTERNAL MEDICINE

## 2021-10-30 PROCEDURE — 82306 VITAMIN D 25 HYDROXY: CPT | Performed by: INTERNAL MEDICINE

## 2021-10-30 PROCEDURE — 84480 ASSAY TRIIODOTHYRONINE (T3): CPT | Performed by: INTERNAL MEDICINE

## 2021-10-30 PROCEDURE — 82607 VITAMIN B-12: CPT | Performed by: INTERNAL MEDICINE

## 2021-10-30 PROCEDURE — 80061 LIPID PANEL: CPT | Performed by: INTERNAL MEDICINE

## 2021-10-30 PROCEDURE — 82728 ASSAY OF FERRITIN: CPT | Performed by: INTERNAL MEDICINE

## 2021-10-30 PROCEDURE — 84443 ASSAY THYROID STIM HORMONE: CPT | Performed by: INTERNAL MEDICINE

## 2021-10-30 PROCEDURE — 81001 URINALYSIS AUTO W/SCOPE: CPT | Performed by: INTERNAL MEDICINE

## 2021-10-30 PROCEDURE — 84439 ASSAY OF FREE THYROXINE: CPT | Performed by: INTERNAL MEDICINE

## 2021-10-30 PROCEDURE — 83540 ASSAY OF IRON: CPT | Performed by: INTERNAL MEDICINE

## 2021-10-30 PROCEDURE — 84466 ASSAY OF TRANSFERRIN: CPT | Performed by: INTERNAL MEDICINE

## 2021-10-30 PROCEDURE — 85025 COMPLETE CBC W/AUTO DIFF WBC: CPT | Performed by: INTERNAL MEDICINE

## 2021-11-03 DIAGNOSIS — D72.819 LEUKOPENIA, UNSPECIFIED TYPE: Primary | ICD-10-CM

## 2021-12-08 ENCOUNTER — HOSPITAL ENCOUNTER (OUTPATIENT)
Dept: MAMMOGRAPHY | Age: 56
Discharge: HOME | End: 2021-12-08
Attending: INTERNAL MEDICINE
Payer: COMMERCIAL

## 2021-12-08 DIAGNOSIS — M79.671 CHRONIC PAIN OF BOTH FEET: ICD-10-CM

## 2021-12-08 DIAGNOSIS — R32 URINARY INCONTINENCE, UNSPECIFIED TYPE: ICD-10-CM

## 2021-12-08 DIAGNOSIS — G89.29 CHRONIC PAIN OF BOTH FEET: ICD-10-CM

## 2021-12-08 DIAGNOSIS — I83.93 VARICOSE VEINS OF BOTH LOWER EXTREMITIES, UNSPECIFIED WHETHER COMPLICATED: ICD-10-CM

## 2021-12-08 DIAGNOSIS — Z12.11 SCREENING FOR COLON CANCER: ICD-10-CM

## 2021-12-08 DIAGNOSIS — Z23 NEED FOR INFLUENZA VACCINATION: ICD-10-CM

## 2021-12-08 DIAGNOSIS — E05.90 SUBCLINICAL HYPERTHYROIDISM: ICD-10-CM

## 2021-12-08 DIAGNOSIS — Z00.00 ANNUAL PHYSICAL EXAM: ICD-10-CM

## 2021-12-08 DIAGNOSIS — M47.816 OSTEOARTHRITIS OF LUMBAR SPINE, UNSPECIFIED SPINAL OSTEOARTHRITIS COMPLICATION STATUS: ICD-10-CM

## 2021-12-08 DIAGNOSIS — L65.9 HAIR LOSS: ICD-10-CM

## 2021-12-08 DIAGNOSIS — Z12.31 ENCOUNTER FOR SCREENING MAMMOGRAM FOR MALIGNANT NEOPLASM OF BREAST: ICD-10-CM

## 2021-12-08 DIAGNOSIS — E04.9 ENLARGED THYROID: ICD-10-CM

## 2021-12-08 DIAGNOSIS — M79.672 CHRONIC PAIN OF BOTH FEET: ICD-10-CM

## 2021-12-08 PROCEDURE — 77067 SCR MAMMO BI INCL CAD: CPT | Performed by: INTERNAL MEDICINE

## 2021-12-08 PROCEDURE — 77063 BREAST TOMOSYNTHESIS BI: CPT | Performed by: INTERNAL MEDICINE

## 2021-12-09 ENCOUNTER — HOSPITAL ENCOUNTER (OUTPATIENT)
Dept: ULTRASOUND IMAGING | Age: 56
Discharge: HOME | End: 2021-12-09
Attending: INTERNAL MEDICINE
Payer: COMMERCIAL

## 2021-12-09 DIAGNOSIS — Z23 NEED FOR INFLUENZA VACCINATION: ICD-10-CM

## 2021-12-09 DIAGNOSIS — Z12.11 SCREENING FOR COLON CANCER: ICD-10-CM

## 2021-12-09 DIAGNOSIS — M79.671 CHRONIC PAIN OF BOTH FEET: ICD-10-CM

## 2021-12-09 DIAGNOSIS — L65.9 HAIR LOSS: ICD-10-CM

## 2021-12-09 DIAGNOSIS — Z00.00 ANNUAL PHYSICAL EXAM: ICD-10-CM

## 2021-12-09 DIAGNOSIS — E04.9 ENLARGED THYROID: ICD-10-CM

## 2021-12-09 DIAGNOSIS — M47.816 OSTEOARTHRITIS OF LUMBAR SPINE, UNSPECIFIED SPINAL OSTEOARTHRITIS COMPLICATION STATUS: ICD-10-CM

## 2021-12-09 DIAGNOSIS — G89.29 CHRONIC PAIN OF BOTH FEET: ICD-10-CM

## 2021-12-09 DIAGNOSIS — Z12.31 ENCOUNTER FOR SCREENING MAMMOGRAM FOR MALIGNANT NEOPLASM OF BREAST: ICD-10-CM

## 2021-12-09 DIAGNOSIS — R32 URINARY INCONTINENCE, UNSPECIFIED TYPE: ICD-10-CM

## 2021-12-09 DIAGNOSIS — M79.672 CHRONIC PAIN OF BOTH FEET: ICD-10-CM

## 2021-12-09 DIAGNOSIS — I83.93 VARICOSE VEINS OF BOTH LOWER EXTREMITIES, UNSPECIFIED WHETHER COMPLICATED: ICD-10-CM

## 2021-12-09 DIAGNOSIS — E05.90 SUBCLINICAL HYPERTHYROIDISM: ICD-10-CM

## 2021-12-09 PROCEDURE — 76536 US EXAM OF HEAD AND NECK: CPT | Performed by: INTERNAL MEDICINE

## 2021-12-10 DIAGNOSIS — R92.2 DENSE BREAST: Primary | ICD-10-CM

## 2021-12-14 DIAGNOSIS — E04.2 MULTIPLE THYROID NODULES: Primary | ICD-10-CM

## 2022-01-22 ENCOUNTER — LAB ENCOUNTER (OUTPATIENT)
Dept: LAB | Age: 57
End: 2022-01-22
Attending: INTERNAL MEDICINE
Payer: COMMERCIAL

## 2022-01-22 DIAGNOSIS — E04.2 MULTIPLE THYROID NODULES: ICD-10-CM

## 2022-01-23 LAB — SARS-COV-2 RNA RESP QL NAA+PROBE: NOT DETECTED

## 2022-01-25 ENCOUNTER — HOSPITAL ENCOUNTER (OUTPATIENT)
Dept: ULTRASOUND IMAGING | Facility: HOSPITAL | Age: 57
Discharge: HOME | End: 2022-01-25
Attending: INTERNAL MEDICINE
Payer: COMMERCIAL

## 2022-01-25 VITALS — DIASTOLIC BLOOD PRESSURE: 70 MMHG | HEART RATE: 66 BPM | RESPIRATION RATE: 18 BRPM | SYSTOLIC BLOOD PRESSURE: 144 MMHG

## 2022-01-25 DIAGNOSIS — E04.2 MULTIPLE THYROID NODULES: ICD-10-CM

## 2022-01-25 PROCEDURE — 88177 CYTP FNA EVAL EA ADDL: CPT | Performed by: INTERNAL MEDICINE

## 2022-01-25 PROCEDURE — 10005 FNA BX W/US GDN 1ST LES: CPT | Performed by: INTERNAL MEDICINE

## 2022-01-25 PROCEDURE — 88172 CYTP DX EVAL FNA 1ST EA SITE: CPT | Performed by: INTERNAL MEDICINE

## 2022-01-25 PROCEDURE — 10006 FNA BX W/US GDN EA ADDL: CPT | Performed by: INTERNAL MEDICINE

## 2022-01-25 PROCEDURE — 88173 CYTOPATH EVAL FNA REPORT: CPT | Performed by: INTERNAL MEDICINE

## 2022-01-25 NOTE — IMAGING NOTE
1400 Pt arrived to ultrasound room #2    1405 Scans taken by LUIS E CRUZ, ultrasound  sonographer     4877 History taken and as follows:  THYROID BX IN 2007; BENIGN. ABNORMAL US THYROID.      1409 Procedure explained questions answered.     1415 Consent veri

## 2022-02-02 ENCOUNTER — TELEPHONE (OUTPATIENT)
Dept: ENDOCRINOLOGY CLINIC | Facility: CLINIC | Age: 57
End: 2022-02-02

## 2022-02-02 NOTE — TELEPHONE ENCOUNTER
Please call patient - she has been lost to follow up for thyroid. Schedule follow up, ok for Res 24. Thanks.

## 2022-02-03 NOTE — TELEPHONE ENCOUNTER
Called to help schedule, pt's  answered and stated she will be home after 3:30. Will call again later.

## 2022-02-16 ENCOUNTER — HOSPITAL ENCOUNTER (OUTPATIENT)
Dept: ULTRASOUND IMAGING | Facility: HOSPITAL | Age: 57
Discharge: HOME | End: 2022-02-16
Attending: INTERNAL MEDICINE
Payer: COMMERCIAL

## 2022-02-16 DIAGNOSIS — R92.2 DENSE BREAST: ICD-10-CM

## 2022-02-16 PROCEDURE — 76641 ULTRASOUND BREAST COMPLETE: CPT | Performed by: INTERNAL MEDICINE

## 2022-03-20 NOTE — TELEPHONE ENCOUNTER
Per chart review, patient had annual exam OV 10/25/21 with [facility] provider. Dr. Tanika Molina to please address.

## 2022-03-21 RX ORDER — METHIMAZOLE 5 MG/1
TABLET ORAL
Qty: 180 TABLET | Refills: 1 | Status: SHIPPED | OUTPATIENT
Start: 2022-03-21

## 2022-05-17 NOTE — TELEPHONE ENCOUNTER
[facility] STD request for [address] rec'd. [facility] sent for HIPAA. 24

## 2022-06-23 LAB — AMB EXT COVID-19 RESULT: DETECTED

## 2022-06-24 ENCOUNTER — TELEPHONE (OUTPATIENT)
Dept: INTERNAL MEDICINE CLINIC | Facility: CLINIC | Age: 57
End: 2022-06-24

## 2022-06-24 ENCOUNTER — TELEMEDICINE (OUTPATIENT)
Dept: INTERNAL MEDICINE CLINIC | Facility: CLINIC | Age: 57
End: 2022-06-24
Payer: COMMERCIAL

## 2022-06-24 DIAGNOSIS — U07.1 COVID-19: Primary | ICD-10-CM

## 2022-06-24 DIAGNOSIS — R52 BODY ACHES: ICD-10-CM

## 2022-06-24 DIAGNOSIS — R50.9 FEVER, UNSPECIFIED FEVER CAUSE: ICD-10-CM

## 2022-06-24 PROCEDURE — 99213 OFFICE O/P EST LOW 20 MIN: CPT | Performed by: INTERNAL MEDICINE

## 2022-06-24 NOTE — TELEPHONE ENCOUNTER
Patient states she tested positive yesterday for Covid. Reports having a fever, cough, sore throat, body aches. Patient denies shortness of breath, chest pain, nausea, vomiting, diarrhea. Patient requesting Paxlovid. Scheduled virtual appt today at 2:15 with Dr. Janki Pitts.

## 2022-12-21 ENCOUNTER — OFFICE VISIT (OUTPATIENT)
Dept: INTERNAL MEDICINE CLINIC | Facility: CLINIC | Age: 57
End: 2022-12-21
Payer: COMMERCIAL

## 2022-12-21 VITALS
BODY MASS INDEX: 29.33 KG/M2 | WEIGHT: 159.38 LBS | HEART RATE: 62 BPM | SYSTOLIC BLOOD PRESSURE: 114 MMHG | HEIGHT: 62 IN | RESPIRATION RATE: 16 BRPM | DIASTOLIC BLOOD PRESSURE: 80 MMHG

## 2022-12-21 DIAGNOSIS — Z12.31 ENCOUNTER FOR SCREENING MAMMOGRAM FOR MALIGNANT NEOPLASM OF BREAST: ICD-10-CM

## 2022-12-21 DIAGNOSIS — Z12.11 SCREENING FOR COLON CANCER: ICD-10-CM

## 2022-12-21 DIAGNOSIS — I83.93 VARICOSE VEINS OF BOTH LOWER EXTREMITIES, UNSPECIFIED WHETHER COMPLICATED: ICD-10-CM

## 2022-12-21 DIAGNOSIS — Z00.00 PHYSICAL EXAM, ANNUAL: Primary | ICD-10-CM

## 2022-12-21 DIAGNOSIS — E05.90 SUBCLINICAL HYPERTHYROIDISM: ICD-10-CM

## 2022-12-21 PROCEDURE — 99396 PREV VISIT EST AGE 40-64: CPT | Performed by: INTERNAL MEDICINE

## 2022-12-21 PROCEDURE — 3008F BODY MASS INDEX DOCD: CPT | Performed by: INTERNAL MEDICINE

## 2022-12-21 PROCEDURE — 90686 IIV4 VACC NO PRSV 0.5 ML IM: CPT | Performed by: INTERNAL MEDICINE

## 2022-12-21 PROCEDURE — 3079F DIAST BP 80-89 MM HG: CPT | Performed by: INTERNAL MEDICINE

## 2022-12-21 PROCEDURE — 90471 IMMUNIZATION ADMIN: CPT | Performed by: INTERNAL MEDICINE

## 2022-12-21 PROCEDURE — 3074F SYST BP LT 130 MM HG: CPT | Performed by: INTERNAL MEDICINE

## 2022-12-21 RX ORDER — METHIMAZOLE 5 MG/1
5 TABLET ORAL DAILY
Qty: 180 TABLET | Refills: 1 | COMMUNITY
Start: 2022-12-21

## 2022-12-24 ENCOUNTER — LAB ENCOUNTER (OUTPATIENT)
Dept: LAB | Age: 57
End: 2022-12-24
Attending: INTERNAL MEDICINE
Payer: COMMERCIAL

## 2022-12-24 LAB
ALBUMIN SERPL-MCNC: 3.6 G/DL (ref 3.4–5)
ALBUMIN/GLOB SERPL: 0.8 {RATIO} (ref 1–2)
ALP LIVER SERPL-CCNC: 79 U/L
ALT SERPL-CCNC: 50 U/L
ANION GAP SERPL CALC-SCNC: 3 MMOL/L (ref 0–18)
AST SERPL-CCNC: 32 U/L (ref 15–37)
BILIRUB SERPL-MCNC: 0.5 MG/DL (ref 0.1–2)
BUN BLD-MCNC: 10 MG/DL (ref 7–18)
BUN/CREAT SERPL: 18.9 (ref 10–20)
CALCIUM BLD-MCNC: 9.1 MG/DL (ref 8.5–10.1)
CHLORIDE SERPL-SCNC: 106 MMOL/L (ref 98–112)
CHOLEST SERPL-MCNC: 170 MG/DL (ref ?–200)
CO2 SERPL-SCNC: 32 MMOL/L (ref 21–32)
CREAT BLD-MCNC: 0.53 MG/DL
DEPRECATED RDW RBC AUTO: 41.1 FL (ref 35.1–46.3)
ERYTHROCYTE [DISTWIDTH] IN BLOOD BY AUTOMATED COUNT: 12.4 % (ref 11–15)
EST. AVERAGE GLUCOSE BLD GHB EST-MCNC: 137 MG/DL (ref 68–126)
FASTING PATIENT LIPID ANSWER: YES
FASTING STATUS PATIENT QL REPORTED: YES
GFR SERPLBLD BASED ON 1.73 SQ M-ARVRAT: 106 ML/MIN/1.73M2 (ref 60–?)
GLOBULIN PLAS-MCNC: 4.4 G/DL (ref 2.8–4.4)
GLUCOSE BLD-MCNC: 99 MG/DL (ref 70–99)
HBA1C MFR BLD: 6.4 % (ref ?–5.7)
HCT VFR BLD AUTO: 40.2 %
HDLC SERPL-MCNC: 68 MG/DL (ref 40–59)
HGB BLD-MCNC: 13.5 G/DL
LDLC SERPL CALC-MCNC: 91 MG/DL (ref ?–100)
MCH RBC QN AUTO: 30.5 PG (ref 26–34)
MCHC RBC AUTO-ENTMCNC: 33.6 G/DL (ref 31–37)
MCV RBC AUTO: 90.7 FL
NONHDLC SERPL-MCNC: 102 MG/DL (ref ?–130)
OSMOLALITY SERPL CALC.SUM OF ELEC: 291 MOSM/KG (ref 275–295)
PLATELET # BLD AUTO: 178 10(3)UL (ref 150–450)
POTASSIUM SERPL-SCNC: 3.6 MMOL/L (ref 3.5–5.1)
PROT SERPL-MCNC: 8 G/DL (ref 6.4–8.2)
RBC # BLD AUTO: 4.43 X10(6)UL
SODIUM SERPL-SCNC: 141 MMOL/L (ref 136–145)
TRIGL SERPL-MCNC: 56 MG/DL (ref 30–149)
TSI SER-ACNC: 1.42 MIU/ML (ref 0.36–3.74)
VLDLC SERPL CALC-MCNC: 9 MG/DL (ref 0–30)
WBC # BLD AUTO: 4.1 X10(3) UL (ref 4–11)

## 2022-12-24 PROCEDURE — 80053 COMPREHEN METABOLIC PANEL: CPT | Performed by: INTERNAL MEDICINE

## 2022-12-24 PROCEDURE — 83036 HEMOGLOBIN GLYCOSYLATED A1C: CPT | Performed by: INTERNAL MEDICINE

## 2022-12-24 PROCEDURE — 80061 LIPID PANEL: CPT | Performed by: INTERNAL MEDICINE

## 2022-12-24 PROCEDURE — 85027 COMPLETE CBC AUTOMATED: CPT | Performed by: INTERNAL MEDICINE

## 2022-12-24 PROCEDURE — 84443 ASSAY THYROID STIM HORMONE: CPT | Performed by: INTERNAL MEDICINE

## 2022-12-24 PROCEDURE — 36415 COLL VENOUS BLD VENIPUNCTURE: CPT | Performed by: INTERNAL MEDICINE

## 2022-12-28 DIAGNOSIS — R73.03 BORDERLINE DIABETES: Primary | ICD-10-CM

## 2023-01-13 ENCOUNTER — HOSPITAL ENCOUNTER (OUTPATIENT)
Dept: MAMMOGRAPHY | Age: 58
Discharge: HOME | End: 2023-01-13
Attending: INTERNAL MEDICINE
Payer: COMMERCIAL

## 2023-01-13 DIAGNOSIS — Z12.31 ENCOUNTER FOR SCREENING MAMMOGRAM FOR MALIGNANT NEOPLASM OF BREAST: ICD-10-CM

## 2023-02-01 ENCOUNTER — HOSPITAL ENCOUNTER (OUTPATIENT)
Dept: MAMMOGRAPHY | Facility: HOSPITAL | Age: 58
Discharge: HOME | End: 2023-02-01
Attending: PHYSICIAN ASSISTANT
Payer: COMMERCIAL

## 2023-02-01 ENCOUNTER — HOSPITAL ENCOUNTER (OUTPATIENT)
Dept: ULTRASOUND IMAGING | Facility: HOSPITAL | Age: 58
Discharge: HOME | End: 2023-02-01
Attending: PHYSICIAN ASSISTANT
Payer: COMMERCIAL

## 2023-02-01 DIAGNOSIS — R92.8 ABNORMAL MAMMOGRAM: ICD-10-CM

## 2023-02-01 DIAGNOSIS — R92.8 ABNORMAL MAMMOGRAM: Primary | ICD-10-CM

## 2023-02-01 PROCEDURE — 77062 BREAST TOMOSYNTHESIS BI: CPT | Performed by: PHYSICIAN ASSISTANT

## 2023-02-01 PROCEDURE — 76642 ULTRASOUND BREAST LIMITED: CPT | Performed by: PHYSICIAN ASSISTANT

## 2023-02-01 PROCEDURE — 77066 DX MAMMO INCL CAD BI: CPT | Performed by: PHYSICIAN ASSISTANT

## 2023-07-05 DIAGNOSIS — E05.90 SUBCLINICAL HYPERTHYROIDISM: ICD-10-CM

## 2023-07-06 DIAGNOSIS — E05.90 SUBCLINICAL HYPERTHYROIDISM: ICD-10-CM

## 2023-07-06 RX ORDER — METHIMAZOLE 5 MG/1
5 TABLET ORAL 2 TIMES DAILY
Qty: 180 TABLET | Refills: 1 | OUTPATIENT
Start: 2023-07-06

## 2023-07-06 NOTE — TELEPHONE ENCOUNTER
Please review. Protocol failed or has no protocol. Requested Prescriptions   Pending Prescriptions Disp Refills    methIMAzole 5 MG Oral Tab 180 tablet 1     Sig: Take 1 tablet (5 mg total) by mouth daily.        Hyperthyroid Medication Protocol Failed - 7/5/2023  5:27 PM        Failed - TSH resulted in past 6 months        Failed - In person appointment or virtual visit in the past 6 mos or appointment in next 3 mos     Recent Outpatient Visits              6 months ago Physical exam, annual    Karely Murillo MD    Office Visit    1 year ago COVID-19    Cami Murillo MD    Telemedicine    1 year ago Annual physical exam    Morena Burch MD    Office Visit    2 years ago Left leg pain    Bibi Hamm, Cami Spears MD    Office Visit    2 years ago     [facility] in [facility] [address] [place]    Office Visit                           Recent Outpatient Visits              6 months ago Physical exam, annual    Karely Murillo MD    Office Visit    1 year ago COVID-19    Bibi Hamm, Cami Spears MD    Telemedicine    1 year ago Annual physical exam    Morena Burch MD    Office Visit    2 years ago Left leg pain    Cami Murillo MD    Office Visit    2 years ago     [facility] Weiner in [facility] [address] [place]    Office Visit

## 2023-07-07 RX ORDER — METHIMAZOLE 5 MG/1
5 TABLET ORAL DAILY
Qty: 180 TABLET | Refills: 1 | Status: SHIPPED | OUTPATIENT
Start: 2023-07-07

## 2023-10-20 ENCOUNTER — TELEPHONE (OUTPATIENT)
Dept: CASE MANAGEMENT | Age: 58
End: 2023-10-20

## 2023-10-20 NOTE — TELEPHONE ENCOUNTER
Dr. Omar Perez,     Patient is requesting mammogram order be faxed to [facility]. [address]  Phone: [phone]  Fax: [phone]    If you have any questions please call patient.      Thank you

## 2023-10-27 ENCOUNTER — TELEPHONE (OUTPATIENT)
Dept: INTERNAL MEDICINE CLINIC | Facility: CLINIC | Age: 58
End: 2023-10-27

## 2023-10-27 NOTE — TELEPHONE ENCOUNTER
Pt's  asking for Dr Triston Layton office to send the report of pt's previous mammogram and ultrasound to:    [facility]  P: [phone]  Fax: [phone]    They need before 10-31-23 when her appt is for her mammogram    Patient declined to ask Medical Records to send it.

## 2023-11-10 ENCOUNTER — MED REC SCAN ONLY (OUTPATIENT)
Dept: INTERNAL MEDICINE CLINIC | Facility: CLINIC | Age: 58
End: 2023-11-10

## 2023-11-13 DIAGNOSIS — N60.02 BREAST CYST, LEFT: Primary | ICD-10-CM

## 2024-04-16 ENCOUNTER — OFFICE VISIT (OUTPATIENT)
Dept: INTERNAL MEDICINE CLINIC | Facility: CLINIC | Age: 59
End: 2024-04-16

## 2024-04-16 VITALS
DIASTOLIC BLOOD PRESSURE: 85 MMHG | WEIGHT: 164 LBS | OXYGEN SATURATION: 95 % | SYSTOLIC BLOOD PRESSURE: 137 MMHG | BODY MASS INDEX: 30.18 KG/M2 | HEIGHT: 62 IN | HEART RATE: 64 BPM

## 2024-04-16 DIAGNOSIS — E04.9 GOITER: ICD-10-CM

## 2024-04-16 DIAGNOSIS — N60.02 BREAST CYST, LEFT: ICD-10-CM

## 2024-04-16 DIAGNOSIS — Z00.00 PHYSICAL EXAM, ANNUAL: Primary | ICD-10-CM

## 2024-04-16 DIAGNOSIS — Z12.11 COLON CANCER SCREENING: ICD-10-CM

## 2024-04-16 DIAGNOSIS — R73.03 BORDERLINE DIABETES: ICD-10-CM

## 2024-04-16 PROCEDURE — 96127 BRIEF EMOTIONAL/BEHAV ASSMT: CPT | Performed by: INTERNAL MEDICINE

## 2024-04-16 PROCEDURE — 99396 PREV VISIT EST AGE 40-64: CPT | Performed by: INTERNAL MEDICINE

## 2024-04-16 NOTE — PROGRESS NOTES
Lisa Banerjee is a 58 year old female.  Chief Complaint   Patient presents with    Physical       HPI:   Pt comes for her annual physical   C/c annual physical   C/o was taking care of her  who wasn't doing well   Noted she had thyroid usg 2021 and biopsy 2022   nodules as well as the thyroid itself has increased when compared to December 9, 2014.   3. It appears that the larger lesions on either side were previously biopsied in 2007.  There are new nodules bilaterally two  on the right and one on the left         HISTORY  2022   New patient  C/C annual physical   C/o leg pain and burnng in the feet   Went for PT for the left leg and it helped but this is different      PMH  Hyperthyroidism - dr velazquez   Chondromalacia knee  H/o covid 6/2022   Goiter         Lives with , works in a factory           Current Outpatient Medications   Medication Sig Dispense Refill    methIMAzole 5 MG Oral Tab Take 1 tablet (5 mg total) by mouth daily. 180 tablet 1      Past Medical History:    Chondromalacia of patella    Hypothyroid    Valvular disease      Past Surgical History:   Procedure Laterality Date    Knee scope,med/lat menisectomy Left 3/18/2015    Procedure: ARTHROSCOPY LEFT KNEE W/ MEDIAL MENISCECTOMY;  Surgeon: Jared Shields MD;  Location: [facility]      Social History:  Social History     Socioeconomic History    Marital status:    Tobacco Use    Smoking status: Never    Smokeless tobacco: Never   Vaping Use    Vaping status: Never Used   Substance and Sexual Activity    Alcohol use: No    Drug use: No   Other Topics Concern    Caffeine Concern Yes     Comment: 3 cups coffee/day        REVIEW OF SYSTEMS:   GENERAL HEALTH: No fevers, chills, sweats, fatigue  VISION: No recent vision problems, blurry vision or double vision  HEENT: No decreased hearing ear pain nasal congestion or sore throat  SKIN: denies any unusual skin lesions or rashes  RESPIRATORY: denies shortness of breath, cough,  wheezing  CARDIOVASCULAR: denies chest pain on exertion, palpitations, swelling in feet  GI: denies abdominal pain and denies heartburn, nausea or vomiting  : No Pain on urination, change in the color of urine, discharge, urinating frequently  MUS: No back pain, joint pain, muscle pain  NEURO: denies headaches , anxiety, depression    EXAM:   /85 (BP Location: Left arm, Patient Position: Sitting)   Pulse 64   Ht 5' 2\" (1.575 m)   Wt 164 lb (74.4 kg)   LMP 10/14/2013 (Approximate)   SpO2 95%   BMI 30.00 kg/m²   GENERAL: well developed, well nourished,in no apparent distress  SKIN: no rashes,no suspicious lesions  HEENT: atraumatic, normocephalic,ears and throat are clear, no frontal or maxillary sinus tenderness, pupils equal reactive to light bilaterally, extract muscles intact  NECK: supple,no adenopathy,+ goiter   LUNGS: clear to auscultation, no wheeze  CARDIO: RRR without murmur  GI: good BS's,no masses or tenderness  EXTREMITIES: no cyanosis, or edema  BREAST: Bilateral breasts without any lumps, bilateral axilla without any lymphadenopathy, no nipple retraction or  discharge    ASSESSMENT AND PLAN:   Diagnoses and all orders for this visit:    Physical exam, annual  -     Comp Metabolic Panel (14)  -     Lipid Panel  -     Assay, Thyroid Stim Hormone  -     Hemoglobin A1C  -     CBC, Platelet; No Differential  Patient to watch what she eats exercise, seatbelt use no texting driving, sunscreen use advised  Borderline diabetes  -     Comp Metabolic Panel (14)  -     Lipid Panel  -     Assay, Thyroid Stim Hormone  -     Hemoglobin A1C  -     CBC, Platelet; No Differential  Follow low-carb low sugar diet and exercise with a goal of 30 minutes a day and we will recheck labs  Breast cyst, left  -     EPI DIAGNOSTIC BILATERAL (CPT=77066); Future  Ordered-bilateral diagnostic mammogram since it has been over a year  Colon cancer screening  -     [facility] GI Telephone Colon Screen  -     Gastro Referral - In  Network  Refer  Goiter  -     US THYROID (CPT=76536); Future  Rpt usg ordered -she may go facility, fax number given            Preventive medicine  Mammogram-ordered  Pap smear October 2021 Dr. Tenorio  Colonoscopy-we will refer  Labs to be done once fasting-ordered  Flu shot today     The patient indicates understanding of these issues and agrees to the plan.  No follow-ups on file.

## 2024-04-20 ENCOUNTER — LAB ENCOUNTER (OUTPATIENT)
Dept: LAB | Age: 59
End: 2024-04-20
Attending: INTERNAL MEDICINE
Payer: COMMERCIAL

## 2024-04-20 LAB
ALBUMIN SERPL-MCNC: 4.2 G/DL (ref 3.2–4.8)
ALBUMIN/GLOB SERPL: 1.2 {RATIO} (ref 1–2)
ALP LIVER SERPL-CCNC: 77 U/L
ALT SERPL-CCNC: 83 U/L
ANION GAP SERPL CALC-SCNC: 6 MMOL/L (ref 0–18)
AST SERPL-CCNC: 66 U/L (ref ?–34)
BILIRUB SERPL-MCNC: 0.5 MG/DL (ref 0.2–1.1)
BUN BLD-MCNC: 13 MG/DL (ref 9–23)
BUN/CREAT SERPL: 19.4 (ref 10–20)
CALCIUM BLD-MCNC: 9.3 MG/DL (ref 8.7–10.4)
CHLORIDE SERPL-SCNC: 108 MMOL/L (ref 98–112)
CHOLEST SERPL-MCNC: 180 MG/DL (ref ?–200)
CO2 SERPL-SCNC: 28 MMOL/L (ref 21–32)
CREAT BLD-MCNC: 0.67 MG/DL
DEPRECATED RDW RBC AUTO: 40.9 FL (ref 35.1–46.3)
EGFRCR SERPLBLD CKD-EPI 2021: 100 ML/MIN/1.73M2 (ref 60–?)
ERYTHROCYTE [DISTWIDTH] IN BLOOD BY AUTOMATED COUNT: 12.5 % (ref 11–15)
EST. AVERAGE GLUCOSE BLD GHB EST-MCNC: 134 MG/DL (ref 68–126)
FASTING PATIENT LIPID ANSWER: YES
FASTING STATUS PATIENT QL REPORTED: YES
GLOBULIN PLAS-MCNC: 3.4 G/DL (ref 2.8–4.4)
GLUCOSE BLD-MCNC: 103 MG/DL (ref 70–99)
HBA1C MFR BLD: 6.3 % (ref ?–5.7)
HCT VFR BLD AUTO: 38.2 %
HDLC SERPL-MCNC: 60 MG/DL (ref 40–59)
HGB BLD-MCNC: 13.2 G/DL
LDLC SERPL CALC-MCNC: 109 MG/DL (ref ?–100)
MCH RBC QN AUTO: 31.1 PG (ref 26–34)
MCHC RBC AUTO-ENTMCNC: 34.6 G/DL (ref 31–37)
MCV RBC AUTO: 89.9 FL
NONHDLC SERPL-MCNC: 120 MG/DL (ref ?–130)
OSMOLALITY SERPL CALC.SUM OF ELEC: 294 MOSM/KG (ref 275–295)
PLATELET # BLD AUTO: 160 10(3)UL (ref 150–450)
POTASSIUM SERPL-SCNC: 3.8 MMOL/L (ref 3.5–5.1)
PROT SERPL-MCNC: 7.6 G/DL (ref 5.7–8.2)
RBC # BLD AUTO: 4.25 X10(6)UL
SODIUM SERPL-SCNC: 142 MMOL/L (ref 136–145)
TRIGL SERPL-MCNC: 57 MG/DL (ref 30–149)
TSI SER-ACNC: 1.29 MIU/ML (ref 0.55–4.78)
VLDLC SERPL CALC-MCNC: 10 MG/DL (ref 0–30)
WBC # BLD AUTO: 4.1 X10(3) UL (ref 4–11)

## 2024-04-20 PROCEDURE — 83036 HEMOGLOBIN GLYCOSYLATED A1C: CPT | Performed by: INTERNAL MEDICINE

## 2024-04-20 PROCEDURE — 36415 COLL VENOUS BLD VENIPUNCTURE: CPT | Performed by: INTERNAL MEDICINE

## 2024-04-20 PROCEDURE — 85027 COMPLETE CBC AUTOMATED: CPT | Performed by: INTERNAL MEDICINE

## 2024-04-20 PROCEDURE — 84443 ASSAY THYROID STIM HORMONE: CPT | Performed by: INTERNAL MEDICINE

## 2024-04-20 PROCEDURE — 80061 LIPID PANEL: CPT | Performed by: INTERNAL MEDICINE

## 2024-04-20 PROCEDURE — 80053 COMPREHEN METABOLIC PANEL: CPT | Performed by: INTERNAL MEDICINE

## 2024-04-26 DIAGNOSIS — R79.89 ELEVATED LFTS: Primary | ICD-10-CM

## 2024-06-14 ENCOUNTER — HOSPITAL ENCOUNTER (OUTPATIENT)
Dept: ULTRASOUND IMAGING | Facility: HOSPITAL | Age: 59
Discharge: HOME | End: 2024-06-14
Attending: INTERNAL MEDICINE
Payer: COMMERCIAL

## 2024-06-14 ENCOUNTER — HOSPITAL ENCOUNTER (OUTPATIENT)
Dept: MAMMOGRAPHY | Facility: HOSPITAL | Age: 59
Discharge: HOME | End: 2024-06-14
Attending: INTERNAL MEDICINE
Payer: COMMERCIAL

## 2024-06-14 DIAGNOSIS — N60.02 BREAST CYST, LEFT: ICD-10-CM

## 2024-06-14 PROCEDURE — 77062 BREAST TOMOSYNTHESIS BI: CPT | Performed by: INTERNAL MEDICINE

## 2024-06-14 PROCEDURE — 77066 DX MAMMO INCL CAD BI: CPT | Performed by: INTERNAL MEDICINE

## 2024-06-14 PROCEDURE — 76642 ULTRASOUND BREAST LIMITED: CPT | Performed by: INTERNAL MEDICINE

## 2024-07-07 DIAGNOSIS — E05.90 SUBCLINICAL HYPERTHYROIDISM: ICD-10-CM

## 2024-07-10 RX ORDER — METHIMAZOLE 5 MG/1
5 TABLET ORAL DAILY
Qty: 90 TABLET | Refills: 3 | Status: SHIPPED | OUTPATIENT
Start: 2024-07-10

## 2024-07-10 NOTE — TELEPHONE ENCOUNTER
Refill passed per [facility] protocol.    Requested Prescriptions   Pending Prescriptions Disp Refills    METHIMAZOLE 5 MG Oral Tab [Pharmacy Med Name: METHIMAZOLE 5 MG TABLET] 90 tablet 3     Sig: Take 1 tablet (5 mg total) by mouth daily.       Hyperthyroid Medication Protocol Passed - 7/7/2024  7:00 AM        Passed - In person appointment or virtual visit in the past 12 mos or appointment in next 3 mos     Recent Outpatient Visits              [facility]    1 year ago Physical exam, annual    [facility] Isha Alcala MD    Office Visit    2 years ago COVIDJason    [facility]    [facility]    2 years ago Annual physical exam    [facility], Deneen Lott MD    Office Visit    3 years ago [facility] leg pain    [facility]                      Passed - TSH resulted in past 12 months        Passed - Last TSH within normal limits     Lab Results   Component Value Date    TSH 1.293 04/20/2024                      Recent Outpatient Visits              2 months ago [facility]    1 year ago [facility]    2 years ago [facility]Jason    [facility]    2 [facility]    3 years ago Left leg pain    [facility], Julio César Figueroa MD    Office Visit

## 2024-07-17 ENCOUNTER — OFFICE VISIT (OUTPATIENT)
Dept: INTERNAL MEDICINE CLINIC | Facility: CLINIC | Age: 59
End: 2024-07-17

## 2024-07-17 ENCOUNTER — NURSE TRIAGE (OUTPATIENT)
Dept: INTERNAL MEDICINE CLINIC | Facility: CLINIC | Age: 59
End: 2024-07-17

## 2024-07-17 VITALS
HEART RATE: 72 BPM | HEIGHT: 62 IN | WEIGHT: 161 LBS | SYSTOLIC BLOOD PRESSURE: 130 MMHG | DIASTOLIC BLOOD PRESSURE: 78 MMHG | BODY MASS INDEX: 29.63 KG/M2

## 2024-07-17 DIAGNOSIS — M54.2 CERVICALGIA: Primary | ICD-10-CM

## 2024-07-17 PROCEDURE — 99214 OFFICE O/P EST MOD 30 MIN: CPT | Performed by: STUDENT IN AN ORGANIZED HEALTH CARE EDUCATION/TRAINING PROGRAM

## 2024-07-17 RX ORDER — BACLOFEN 10 MG/1
10 TABLET ORAL 3 TIMES DAILY PRN
Qty: 90 TABLET | Refills: 0 | Status: SHIPPED | OUTPATIENT
Start: 2024-07-17 | End: 2024-08-16

## 2024-07-17 RX ORDER — NAPROXEN 500 MG/1
500 TABLET ORAL 2 TIMES DAILY WITH MEALS
Qty: 60 TABLET | Refills: 0 | Status: SHIPPED | OUTPATIENT
Start: 2024-07-17 | End: 2024-08-16

## 2024-07-17 RX ORDER — METHYLPREDNISOLONE 4 MG/1
TABLET ORAL
Qty: 1 EACH | Refills: 0 | Status: SHIPPED | OUTPATIENT
Start: 2024-07-17

## 2024-07-17 NOTE — PROGRESS NOTES
OFFICE NOTE     Patient ID: Lisa Banerjee is a 58 year old female.  Today's Date: 07/17/24  Chief Complaint: Neck Pain (1.5 week neck pain/stiffness)    Pt is a 57y/o female with Pmhx of pre-diabetes, subclinical hyperthyroidism, neural foraminal stenosis of lumber spine, urinary incontinence whom presents to clinic with ongoing right sided neck pain.     Neck Pain   This is a new problem. The current episode started 1 to 4 weeks ago. The problem occurs daily. The problem has been gradually improving. The pain is associated with a sleep position. The pain is present in the right side. The quality of the pain is described as shooting and stabbing. The pain is at a severity of 6/10. The pain is moderate. The pain is Worse during the night. Pertinent negatives include no chest pain, fever, headaches, leg pain, numbness, pain with swallowing, paresis, photophobia, syncope, tingling, trouble swallowing, visual change, weakness or weight loss.         Vitals:    07/17/24 1539 07/17/24 1544   BP: 151/77 130/78   Pulse: 71 72   Weight: 161 lb (73 kg)    Height: 5' 2\" (1.575 m)      body mass index is 29.45 kg/m².  BP Readings from Last 3 Encounters:   07/17/24 130/78   04/16/24 137/85   12/21/22 114/80     The 10-year ASCVD risk score (Chente GRANADOS, et al., 2019) is: 3%    Values used to calculate the score:      Age: 58 years      Sex: Female      Is Non- : No      Diabetic: No      Tobacco smoker: No      Systolic Blood Pressure: 130 mmHg      Is BP treated: No      HDL Cholesterol: 60 mg/dL      Total Cholesterol: 180 mg/dL      Medications reviewed:  Current Outpatient Medications   Medication Sig Dispense Refill    naproxen 500 MG Oral Tab Take 1 tablet (500 mg total) by mouth 2 (two) times daily with meals. 60 tablet 0    baclofen 10 MG Oral Tab Take 1 tablet (10 mg total) by mouth 3 (three) times daily as needed. 90 tablet 0    methylPREDNISolone 4 MG Oral Tablet Therapy Pack Take  as directed with food. 1 each 0    methIMAzole 5 MG Oral Tab Take 1 tablet (5 mg total) by mouth daily. 90 tablet 3         Assessment & Plan    1. Cervicalgia (Primary)  -     Naproxen; Take 1 tablet (500 mg total) by mouth 2 (two) times daily with meals.  Dispense: 60 tablet; Refill: 0  -     Baclofen; Take 1 tablet (10 mg total) by mouth 3 (three) times daily as needed.  Dispense: 90 tablet; Refill: 0  -     methylPREDNISolone; Take as directed with food.  Dispense: 1 each; Refill: 0  Positive right Spurling test, with right neck/upper back pain  Plan:  -start medrol dose pack  -start baclofen 10mg po TID PRN  -continue NSAIDS (Ibuprofen/aleve OTC after steroids complete)  -PT referral in-network (next available/open site), if not availability next week said we could send out of network if approved by insurance.   -home exercise printed in AVS.   -Recommended to get NeovacsPActBlueNeck™ Pillow (firm: purchase from [employer] or [employer]).  -Recommended to purchase RESTCLOUD Neck and Shoulder Relaxer, Cervical Traction Device for TMJ Pain Relief and Cervical Spine Alignment, Chiropractic Pillow Neck Stretcher (on [employer])- to lie down with foam insert behind neck and ground and extend neck and lie on ground for 5-15 minutes daily for neck pain relief and neck stretches provided in AVS in the interm till seen by Physical therapy    Neck Exercises: Active Neck Rotation  To start, lie on your back, knees bent and feet flat on the floor. Keep your ears, shoulders, and hips aligned, but don’t press your lower back to the floor. Rest your hands on your pelvis. Breathe deeply and relax.   Here are the steps for the active neck rotation:  Use your neck muscles to turn your head to one side until you feel a stretch in the muscles.  Hold for  5 seconds. Then turn to the other side.  Repeat  5 times on each side.  Note: Keep your shoulders on the floor. Don’t lift or tuck your chin as you turn your head.   StayWell last  reviewed this educational content on 7/1/2020  © 6474-5703 [employer]. All rights reserved. This information is not intended as a substitute for professional medical care. Always follow your healthcare professional's instructions.        Neck Exercises: Overhead Arm Raise  To start, lie on your back, knees bent and feet flat on the floor. Keep your ears, shoulders, and hips aligned, but don’t press your lower back to the floor. Rest your hands on your pelvis. Breathe deeply and relax. Tighten the belly muscles to keep the back from arching.   Here are the steps for the arm lift:  Raise one arm overhead, then lower it. As you lower that arm, raise the other arm.  Continue to move both arms in slow, smooth arcs. Keep your arms straight and your head and neck relaxed.  Repeat  10 times with each arm.  For your safety, check with your healthcare provider before starting an exercise program.   [employer] last reviewed this educational content on 7/1/2020  © 4758-4158 [employer]. All rights reserved. This information is not intended as a substitute for professional medical care. Always follow your healthcare professional's instructions.        Neck Exercises: Head Lifts    Do this exercise on your hands and knees. Keep your knees under your hips and your hands under your shoulders. Keep your spine in a neutral position (not arched or sagging). Keep your ears in line with your shoulders. Hold for a few seconds before starting the exercise:  Keeping your back straight, slowly drop your chin toward your chest. Tuck in your chin.  Hold for 5 seconds. Then lift your head until your neck is level with your back.  Hold for 5 seconds. Repeat 5 to10 times.  Nathan last reviewed this educational content on 3/1/2018  © 1498-2964 The [employer]. All rights reserved. This information is not intended as a substitute for professional medical care. Always follow your healthcare professional's  instructions.        Neck Exercises: Neck and Torso Rotation   To start, lie on your back, knees bent and feet flat on the floor. Keep your ears, shoulders, and hips aligned, but don’t press your lower back to the floor. Breathe deeply and relax.  From starting position, drop both knees to one side. At the same time, turn your head and look in the other direction.  Keep both feet in contact with the floor and keep your arms at your sides.  Hold for 5 seconds. Then slowly switch sides.  Repeat 5 to 10 times.  LYFE Kitchen last reviewed this educational content on 3/1/2018  © 7995-5303 [employer]. All rights reserved. This information is not intended as a substitute for professional medical care. Always follow your healthcare professional's instructions.        Neck Exercises: Neck Flex  To start, sit in a chair with your feet flat on the floor. Your weight should be slightly forward so that you’re balanced evenly on your buttocks. Relax your shoulders and keep your head level. Avoid arching your back or rounding your shoulders. Using a chair with arms may help you keep your balance:  Rest the back of your left hand against your lower back. Place your right palm on the top of your head.  Gently pull your head forward and down until you feel a stretch in the muscles in the back of your neck. Don’t force the motion.  Hold for 20 seconds, then return to starting position. Switch arms.  Repeat 5 to 10 times.    StayWell last reviewed this educational content on 3/1/2018  © 3236-5119 The [employer]. All rights reserved. This information is not intended as a substitute for professional medical care. Always follow your healthcare professional's instructions.        Neck Exercises: Neck Rotation    To start, lie on your back, knees bent and feet flat on the floor. Keep your ears, shoulders, and hips aligned, but don’t press your lower back to the floor. Rest your hands on your pelvis. Breathe deeply and  relax.   Here are the steps for passive neck rotation. There are 2 ways to do this exercise:    With hand. With your neck relaxed, place the palm of one hand on your forehead. Use your hand to turn your head to one side (over your shoulder) until you feel a stretch in the neck muscles. Don't push through pain.  Without hand. With your neck relaxed, turn your head to one side (over your shoulder) until you feel a stretch in the neck muscles. Don't push through pain.  Hold for  5 seconds. Then turn to the other side.  Repeat  5 times on each side.   Note: Keep your shoulders on the floor. Don’t lift your chin as you turn your head.   [employer] last reviewed this educational content on 2020 The [employer]. All rights reserved. This information is not intended as a substitute for professional medical care. Always follow your healthcare professional's instructions.          Follow Up: As needed/if symptoms worsen or Return in about 10 days (around 2024), or if symptoms worsen or fail to improve..     I spent 37 minutes obtaining pertitent medical history, reviewing pertinent imaging/labs and specialists notes, evaluating patient, discussing differential diagnosis' and various treatment options, reinforcing importance of compliance with treatment plan, and completing documentation.     Encounter Times  PreChartin minutes    Reviewing/Obtainin minutes      Medical Exam: 4 minutes    Plan: 5 minutes      Notes: 4 minutes    Counseling/Education: 6 minutes      Referring/Communicating:   minutes    Ind Interpretation:   minutes      Care Coordination:   minutes       My total time spent caring for the patient on the day of the encounter: 37 minutes.          Objective/ Results:   Physical Exam  Constitutional:       Appearance: She is well-developed.   Neck:      Comments: Positive Spurlings test right neck    Cardiovascular:      Rate and Rhythm: Normal rate and regular rhythm.       Heart sounds: Normal heart sounds.   Pulmonary:      Effort: Pulmonary effort is normal.      Breath sounds: Normal breath sounds.   Abdominal:      General: Bowel sounds are normal.      Palpations: Abdomen is soft.   Musculoskeletal:      Cervical back: Rigidity present. Spinous process tenderness and muscular tenderness present. Decreased range of motion.   Skin:     General: Skin is warm and dry.   Neurological:      Mental Status: She is alert and oriented to person, place, and time.      Deep Tendon Reflexes: Reflexes are normal and symmetric.        Reviewed:    Patient Active Problem List    Diagnosis    Breast cyst, left    Borderline diabetes    Goiter    Osteoarthritis of lumbar spine    Chronic pain of both feet    Varicose veins of both lower extremities    Subclinical hyperthyroidism    Annual physical exam    Urinary incontinence    Neural foraminal stenosis of lumbar spine    Chondromalacia of patella      No Known Allergies     Social History     Socioeconomic History    Marital status:    Tobacco Use    Smoking status: Never     Passive exposure: Never    Smokeless tobacco: Never   Vaping Use    Vaping status: Never Used   Substance and Sexual Activity    Alcohol use: No    Drug use: No   Other Topics Concern    Caffeine Concern Yes     Comment: 3 cups coffee/day      Review of Systems   Constitutional:  Negative for fever and weight loss.   HENT:  Negative for trouble swallowing.    Eyes:  Negative for photophobia.   Cardiovascular:  Negative for chest pain and syncope.   Musculoskeletal:  Positive for neck pain.   Neurological:  Negative for tingling, weakness, numbness and headaches.     All other systems negative unless otherwise stated in ROS or HPI above.       Eric Finn MD  Internal Medicine       Call office with any questions or seek emergency care if necessary.   Patient understands and agrees to follow directions and advice.      -----------------------------------------  PATIENT INSTRUCTIONS-----------------------------------------     Patient Instructions   Neck Exercises: Active Neck Rotation  To start, lie on your back, knees bent and feet flat on the floor. Keep your ears, shoulders, and hips aligned, but don’t press your lower back to the floor. Rest your hands on your pelvis. Breathe deeply and relax.   Here are the steps for the active neck rotation:  Use your neck muscles to turn your head to one side until you feel a stretch in the muscles.  Hold for  5 seconds. Then turn to the other side.  Repeat  5 times on each side.  Note: Keep your shoulders on the floor. Don’t lift or tuck your chin as you turn your head.   [employer] last reviewed this educational content on 7/1/2020 © 2000-2020 The [employer]. All rights reserved. This information is not intended as a substitute for professional medical care. Always follow your healthcare professional's instructions.        Neck Exercises: Overhead Arm Raise  To start, lie on your back, knees bent and feet flat on the floor. Keep your ears, shoulders, and hips aligned, but don’t press your lower back to the floor. Rest your hands on your pelvis. Breathe deeply and relax. Tighten the belly muscles to keep the back from arching.   Here are the steps for the arm lift:  Raise one arm overhead, then lower it. As you lower that arm, raise the other arm.  Continue to move both arms in slow, smooth arcs. Keep your arms straight and your head and neck relaxed.  Repeat  10 times with each arm.  For your safety, check with your healthcare provider before starting an exercise program.   [employer] last reviewed this educational content on 7/1/2020 © 2000-2020 The [employer]. All rights reserved. This information is not intended as a substitute for professional medical care. Always follow your healthcare professional's instructions.        Neck Exercises: Head Lifts    Do this exercise on your hands and knees. Keep your  knees under your hips and your hands under your shoulders. Keep your spine in a neutral position (not arched or sagging). Keep your ears in line with your shoulders. Hold for a few seconds before starting the exercise:  Keeping your back straight, slowly drop your chin toward your chest. Tuck in your chin.  Hold for 5 seconds. Then lift your head until your neck is level with your back.  Hold for 5 seconds. Repeat 5 to10 times.  [employer] last reviewed this educational content on 3/1/2018  © 3332-0612 The [employer]. All rights reserved. This information is not intended as a substitute for professional medical care. Always follow your healthcare professional's instructions.        Neck Exercises: Neck and Torso Rotation   To start, lie on your back, knees bent and feet flat on the floor. Keep your ears, shoulders, and hips aligned, but don’t press your lower back to the floor. Breathe deeply and relax.  From starting position, drop both knees to one side. At the same time, turn your head and look in the other direction.  Keep both feet in contact with the floor and keep your arms at your sides.  Hold for 5 seconds. Then slowly switch sides.  Repeat 5 to 10 times.  [employer] last reviewed this educational content on 3/1/2018  © 9052-7881 [employer]. All rights reserved. This information is not intended as a substitute for professional medical care. Always follow your healthcare professional's instructions.        Neck Exercises: Neck Flex  To start, sit in a chair with your feet flat on the floor. Your weight should be slightly forward so that you’re balanced evenly on your buttocks. Relax your shoulders and keep your head level. Avoid arching your back or rounding your shoulders. Using a chair with arms may help you keep your balance:  Rest the back of your left hand against your lower back. Place your right palm on the top of your head.  Gently pull your head forward and down until you feel  a stretch in the muscles in the back of your neck. Don’t force the motion.  Hold for 20 seconds, then return to starting position. Switch arms.  Repeat 5 to 10 times.    [employer] last reviewed this educational content on 3/1/2018  © 7766-5836 The [employer]. All rights reserved. This information is not intended as a substitute for professional medical care. Always follow your healthcare professional's instructions.        Neck Exercises: Neck Rotation    To start, lie on your back, knees bent and feet flat on the floor. Keep your ears, shoulders, and hips aligned, but don’t press your lower back to the floor. Rest your hands on your pelvis. Breathe deeply and relax.   Here are the steps for passive neck rotation. There are 2 ways to do this exercise:    With hand. With your neck relaxed, place the palm of one hand on your forehead. Use your hand to turn your head to one side (over your shoulder) until you feel a stretch in the neck muscles. Don't push through pain.  Without hand. With your neck relaxed, turn your head to one side (over your shoulder) until you feel a stretch in the neck muscles. Don't push through pain.  Hold for  5 seconds. Then turn to the other side.  Repeat  5 times on each side.   Note: Keep your shoulders on the floor. Don’t lift your chin as you turn your head.   StayWell last reviewed this educational content on 7/1/2020  © 8813-9519 The [employer]. All rights reserved. This information is not intended as a substitute for professional medical care. Always follow your healthcare professional's instructions.       The 21st Century Cures Act makes medical notes available to patients in the interest of transparency.  However, please be advised that this is a medical document.  It is intended as a peer to peer communication.  It is written in medical language and may contain abbreviations or verbiage that are technical and unfamiliar.  It may appear blunt or direct.  Medical  documents are intended to carry relevant information, facts as evident, and the clinical opinion of the practitioner.

## 2024-07-17 NOTE — TELEPHONE ENCOUNTER
Action Requested: Summary for Provider     []  Critical Lab, Recommendations Needed  [] Need Additional Advice  []   FYI    []   Need Orders  [] Need Medications Sent to Pharmacy  []  Other     SUMMARY: Visit within 3 days. Scheduled the following visit (Dr Banerjee out of the office)     Future Appointments   Date Time Provider Department Center   7/17/2024  3:40 PM Eric Finn MD [facility]     Reason for call: Neck Pain  Onset: 3 days     Per daughter Penny (not on verbal release, was able to take information without sharing any medical information). Neck pain with limited range of motion. Symptoms started 3 days ago. Not effecting sleep. Pain is not severe. Patient is able to complete normal daily activities but difficult due to stiffness. NO changes in bowel or bladder control.  No headache present. Afebrile.     Reason for Disposition   MODERATE neck pain (e.g., interferes with normal activities like work or school)    Protocols used: Neck Pain or [id number]

## 2024-07-17 NOTE — PATIENT INSTRUCTIONS
Neck Exercises: Active Neck Rotation  To start, lie on your back, knees bent and feet flat on the floor. Keep your ears, shoulders, and hips aligned, but don’t press your lower back to the floor. Rest your hands on your pelvis. Breathe deeply and relax.   Here are the steps for the active neck rotation:  Use your neck muscles to turn your head to one side until you feel a stretch in the muscles.  Hold for  5 seconds. Then turn to the other side.  Repeat  5 times on each side.  Note: Keep your shoulders on the floor. Don’t lift or tuck your chin as you turn your head.   [employer] last reviewed this educational content on 7/1/2020  © 5308-8531 [employer]. All rights reserved. This information is not intended as a substitute for professional medical care. Always follow your healthcare professional's instructions.        Neck Exercises: Overhead Arm Raise  To start, lie on your back, knees bent and feet flat on the floor. Keep your ears, shoulders, and hips aligned, but don’t press your lower back to the floor. Rest your hands on your pelvis. Breathe deeply and relax. Tighten the belly muscles to keep the back from arching.   Here are the steps for the arm lift:  Raise one arm overhead, then lower it. As you lower that arm, raise the other arm.  Continue to move both arms in slow, smooth arcs. Keep your arms straight and your head and neck relaxed.  Repeat  10 times with each arm.  For your safety, check with your healthcare provider before starting an exercise program.   [employer] last reviewed this educational content on 7/1/2020 © 2000-2020 [employer]. All rights reserved. This information is not intended as a substitute for professional medical care. Always follow your healthcare professional's instructions.        Neck Exercises: Head Lifts    Do this exercise on your hands and knees. Keep your knees under your hips and your hands under your shoulders. Keep your spine in a neutral  position (not arched or sagging). Keep your ears in line with your shoulders. Hold for a few seconds before starting the exercise:  Keeping your back straight, slowly drop your chin toward your chest. Tuck in your chin.  Hold for 5 seconds. Then lift your head until your neck is level with your back.  Hold for 5 seconds. Repeat 5 to10 times.  [employer] last reviewed this educational content on 3/1/2018  © 7569-3905 [employer]. All rights reserved. This information is not intended as a substitute for professional medical care. Always follow your healthcare professional's instructions.        Neck Exercises: Neck and Torso Rotation   To start, lie on your back, knees bent and feet flat on the floor. Keep your ears, shoulders, and hips aligned, but don’t press your lower back to the floor. Breathe deeply and relax.  From starting position, drop both knees to one side. At the same time, turn your head and look in the other direction.  Keep both feet in contact with the floor and keep your arms at your sides.  Hold for 5 seconds. Then slowly switch sides.  Repeat 5 to 10 times.  [employer] last reviewed this educational content on 3/1/2018  © 4644-4194 [employer]. All rights reserved. This information is not intended as a substitute for professional medical care. Always follow your healthcare professional's instructions.        Neck Exercises: Neck Flex  To start, sit in a chair with your feet flat on the floor. Your weight should be slightly forward so that you’re balanced evenly on your buttocks. Relax your shoulders and keep your head level. Avoid arching your back or rounding your shoulders. Using a chair with arms may help you keep your balance:  Rest the back of your left hand against your lower back. Place your right palm on the top of your head.  Gently pull your head forward and down until you feel a stretch in the muscles in the back of your neck. Don’t force the motion.  Hold for 20  seconds, then return to starting position. Switch arms.  Repeat 5 to 10 times.    [employer] last reviewed this educational content on 3/1/2018  © 5403-6881 [employer]. All rights reserved. This information is not intended as a substitute for professional medical care. Always follow your healthcare professional's instructions.        Neck Exercises: Neck Rotation    To start, lie on your back, knees bent and feet flat on the floor. Keep your ears, shoulders, and hips aligned, but don’t press your lower back to the floor. Rest your hands on your pelvis. Breathe deeply and relax.   Here are the steps for passive neck rotation. There are 2 ways to do this exercise:    With hand. With your neck relaxed, place the palm of one hand on your forehead. Use your hand to turn your head to one side (over your shoulder) until you feel a stretch in the neck muscles. Don't push through pain.  Without hand. With your neck relaxed, turn your head to one side (over your shoulder) until you feel a stretch in the neck muscles. Don't push through pain.  Hold for  5 seconds. Then turn to the other side.  Repeat  5 times on each side.   Note: Keep your shoulders on the floor. Don’t lift your chin as you turn your head.   [employer] last reviewed this educational content on 7/1/2020  © 1451-9837 [employer]. All rights reserved. This information is not intended as a substitute for professional medical care. Always follow your healthcare professional's instructions.

## 2024-08-13 ENCOUNTER — TELEPHONE (OUTPATIENT)
Dept: INTERNAL MEDICINE CLINIC | Facility: CLINIC | Age: 59
End: 2024-08-13

## 2024-08-13 DIAGNOSIS — M54.2 CERVICALGIA: ICD-10-CM

## 2024-08-16 NOTE — TELEPHONE ENCOUNTER
Refill passed per [facility] protocol.    Please review last office visit 07/17/2024      last refill 07/17/2024    Is refill appropriate?     Requested Prescriptions   Pending Prescriptions Disp Refills    NAPROXEN 500 MG Oral Tab [Pharmacy Med Name: NAPROXEN 500 MG TABLET] 60 tablet 0     Sig: TAKE 1 TABLET BY MOUTH TWICE A DAY WITH MEALS       Non-Narcotic Pain Medication Protocol Passed - 8/13/2024 12:16 AM        Passed - In person appointment or virtual visit in the past 6 mos or appointment in next 3 mos     Recent Outpatient Visits              1 month ago Cervicalgia    [facility], Eric Arzate MD    Office Visit    4 months ago Physical exam, annual    [facility] Isha Alcala MD    Office Visit    1 year ago Physical exam, [facility] Isha Alcala MD    Office Visit    2 years ago [address], [place]    Telemedicine    2 years [facility]    [facility]                         Recent Outpatient Visits              1 month ago Cervicalgia    [facility], Eric Arzate MD    Office Visit    4 months ago Physical exam, annual    [facility] Isha Alcala MD    Office Visit    1 year ago Physical exam, annual    [facility] Isha Alcala MD    Office Visit    2 years ago [address] Julio César Figueroa MD    Telemedicine    2 years ago Annual physical exam    [facility], Deneen Lott MD    Office Visit

## 2024-08-17 RX ORDER — NAPROXEN 500 MG/1
500 TABLET ORAL 2 TIMES DAILY WITH MEALS
Qty: 180 TABLET | Refills: 0 | OUTPATIENT
Start: 2024-08-17

## 2024-08-17 NOTE — TELEPHONE ENCOUNTER
Was not on this when she saw me, should not be used long-term, should be taken only as needed and after meals

## 2024-08-22 ENCOUNTER — TELEPHONE (OUTPATIENT)
Dept: INTERNAL MEDICINE CLINIC | Facility: CLINIC | Age: 59
End: 2024-08-22

## 2024-08-22 NOTE — TELEPHONE ENCOUNTER
Brandon (-ALEX) called  , verified patient's name and date of birth .  Requesting to fax the US thyroid order to [facility] fax number [phone].      RN sent the order via RIGHT FAX today .     Chart Review Routing History    Recipients Sent On Sent By Routed Reports    [facility]    8/22/2024 11:31 AM Nai Hines RN US THYROID (CPT=76536) ([id number])

## 2024-08-22 NOTE — TELEPHONE ENCOUNTER
Brandon (-ALEX) called back , verified patient's name and date of birth . Informed Dr Dent's note regarding the naproxen denial, stated understanding .

## 2024-09-25 ENCOUNTER — MED REC SCAN ONLY (OUTPATIENT)
Dept: INTERNAL MEDICINE CLINIC | Facility: CLINIC | Age: 59
End: 2024-09-25

## 2024-09-25 ENCOUNTER — TELEPHONE (OUTPATIENT)
Dept: INTERNAL MEDICINE CLINIC | Facility: CLINIC | Age: 59
End: 2024-09-25

## 2024-09-25 NOTE — TELEPHONE ENCOUNTER
[facility] report received for US Thyroid sent to scan reviewed by Dr. Banerjee

## 2025-06-18 ENCOUNTER — OFFICE VISIT (OUTPATIENT)
Dept: INTERNAL MEDICINE CLINIC | Facility: CLINIC | Age: 60
End: 2025-06-18

## 2025-06-18 VITALS
OXYGEN SATURATION: 97 % | SYSTOLIC BLOOD PRESSURE: 160 MMHG | HEART RATE: 64 BPM | TEMPERATURE: 97 F | BODY MASS INDEX: 29.63 KG/M2 | WEIGHT: 161 LBS | DIASTOLIC BLOOD PRESSURE: 80 MMHG | HEIGHT: 62 IN

## 2025-06-18 DIAGNOSIS — Z01.419 ENCOUNTER FOR ROUTINE GYNECOLOGICAL EXAMINATION WITH PAPANICOLAOU SMEAR OF CERVIX: ICD-10-CM

## 2025-06-18 DIAGNOSIS — R20.0 NUMBNESS AND TINGLING: ICD-10-CM

## 2025-06-18 DIAGNOSIS — Z12.11 COLON CANCER SCREENING: ICD-10-CM

## 2025-06-18 DIAGNOSIS — E53.9 BURNING FEET SYNDROME: ICD-10-CM

## 2025-06-18 DIAGNOSIS — R73.03 BORDERLINE DIABETES: ICD-10-CM

## 2025-06-18 DIAGNOSIS — R03.0 WHITE COAT SYNDROME WITH HIGH BLOOD PRESSURE WITHOUT HYPERTENSION: ICD-10-CM

## 2025-06-18 DIAGNOSIS — R20.2 NUMBNESS AND TINGLING: ICD-10-CM

## 2025-06-18 DIAGNOSIS — E55.9 VITAMIN D DEFICIENCY: ICD-10-CM

## 2025-06-18 DIAGNOSIS — Z12.31 SCREENING MAMMOGRAM, ENCOUNTER FOR: ICD-10-CM

## 2025-06-18 DIAGNOSIS — E05.90 SUBCLINICAL HYPERTHYROIDISM: ICD-10-CM

## 2025-06-18 DIAGNOSIS — Z00.00 PHYSICAL EXAM, ANNUAL: Primary | ICD-10-CM

## 2025-06-18 PROCEDURE — 99396 PREV VISIT EST AGE 40-64: CPT | Performed by: INTERNAL MEDICINE

## 2025-06-18 RX ORDER — METHIMAZOLE 5 MG/1
5 TABLET ORAL DAILY
Qty: 90 TABLET | Refills: 3 | Status: SHIPPED | OUTPATIENT
Start: 2025-06-18

## 2025-06-18 NOTE — PROGRESS NOTES
Lisa Banerjee is a 59 year old female.  Chief Complaint   Patient presents with    Physical     Declines tdap       HPI:   Pt comes for her annual physical   C/c annual physical   C/o overall doing well  Noted she had an ultrasound done which does not require any further workup, she has had biopsies in the past  No change in her voice no difficulties breathing or swallowing          History  4/2024   was taking care of her  who wasn't doing well   Noted she had thyroid usg 2021 and biopsy 2022   nodules as well as the thyroid itself has increased when compared to December 9, 2014.   3. It appears that the larger lesions on either side were previously biopsied in 2007.  There are new nodules bilaterally two  on the right and one on the left            HISTORY  2022   New patient  C/C annual physical   C/o leg pain and burnng in the feet   Went for PT for the left leg and it helped but this is different      [facility]  Hyperthyroidism - dr velazquez   Chondromalacia knee  H/o covid 6/2022   Goiter and nodules but no further w/p needed         Lives with , works in a factory        Current Medications[1]   Past Medical History[2]   Past Surgical History[3]   Social History:  Short Social Hx on File[4]     REVIEW OF SYSTEMS:   GENERAL HEALTH: No fevers, chills, sweats, fatigue  VISION: No recent vision problems, blurry vision or double vision  HEENT: No decreased hearing ear pain nasal congestion or sore throat  SKIN: denies any unusual skin lesions or rashes  RESPIRATORY: denies shortness of breath, cough, wheezing  CARDIOVASCULAR: denies chest pain on exertion, palpitations, swelling in feet  GI: denies abdominal pain and denies heartburn, nausea or vomiting  : No Pain on urination, change in the color of urine, discharge, urinating frequently  MUS: No back pain, joint pain, muscle pain  NEURO: denies headaches , anxiety, depression    EXAM:   /80   Pulse 64   Temp 97.1 °F (36.2 °C)   Ht 5' 2\" (1.575  m)   Wt 161 lb (73 kg)   LMP 10/14/2013 (Approximate)   SpO2 97%   BMI 29.45 kg/m²   GENERAL: well developed, well nourished,in no apparent distress  SKIN: no rashes,no suspicious lesions  HEENT: atraumatic, normocephalic,ears and throat are clear, no frontal or maxillary sinus tenderness, pupils equal reactive to light bilaterally, extraocular muscles intact  NECK: supple,no adenopathy,+ goiter   LUNGS: clear to auscultation, no wheeze  CARDIO: RRR without murmur  GI: good BS's,no masses or tenderness  EXTREMITIES: no cyanosis, or edema    ASSESSMENT AND PLAN:   Diagnoses and all orders for this visit:    Physical exam, annual  -     Comp Metabolic Panel (14)  -     Hemoglobin A1C  -     Lipid Panel  -     TSH W Reflex To Free T4  -     Vitamin D; Future  -     CBC, Platelet; No Differential  Patient to follow low-carb low sugar diet and exercise with a goal of 30 minutes a day, we will recheck the A1c  Screening mammogram, encounter for  -     [facility]AM 2D+3D SCREENING BILAT (CPT=77067/[id number]); Future  Ordered   Encounter for routine gynecological examination with Papanicolaou smear of cervix  -     OBG Referral - In Network  Refer   Colon cancer screening  -     Gastro Referral - In Network  -     Gastro GI Telephone Colon Screen; Future  Refer   Vitamin D deficiency  -     Vitamin D; Future  Recheck   Borderline diabetes  -     Hemoglobin A1C    Burning feet syndrome  -     Vitamin B12  -     Folic Acid Serum (Folate)  -     Vitamin B1 (Thiamine), Blood [E]  -     Vitamin B2  And   Numbness and tingling  -     Vitamin B12  -     Folic Acid Serum (Folate)  -     Vitamin B1 (Thiamine), Blood [E]  -     Vitamin B2  Check labs   Subclinical hyperthyroidism  -     methIMAzole 5 MG Oral Tab; Take 1 tablet (5 mg total) by mouth daily.  Cont meds and check thyroid function     White coat syndrome with high blood pressure without hypertension    Check BP at home and let me know , I did she is aware that she may need to  be started on medications  Follow low-salt low-sodium diet and exercise multiple 30 minutes a day      Preventive medicine  Mammogram-ordered  Pap smear October 2021 Dr. Tenorio  Colonoscopy- will refer  Labs to be done once fasting-ordered  Flu shot today    The patient indicates understanding of these issues and agrees to the plan.  No follow-ups on file.       [1]   Current Outpatient Medications   Medication Sig Dispense Refill    methIMAzole 5 MG Oral Tab Take 1 tablet (5 mg total) by mouth daily. 90 tablet 3   [2]   Past Medical History:   Chondromalacia of patella    Hypothyroid    Valvular disease   [3]   Past Surgical History:  Procedure Laterality Date    Knee scope,med/lat menisectomy Left 3/18/2015    Procedure: ARTHROSCOPY LEFT KNEE W/ MEDIAL MENISCECTOMY;  Surgeon: Jared Shields MD;  [facility]   [4]   Social History  Socioeconomic History    Marital status:    Tobacco Use    Smoking status: Never     Passive exposure: Never    Smokeless tobacco: Never   Vaping Use    Vaping status: Never Used   Substance and Sexual Activity    Alcohol use: No    Drug use: No   Other Topics Concern    Caffeine Concern Yes     Comment: 3 cups coffee/day

## 2025-06-21 ENCOUNTER — LAB ENCOUNTER (OUTPATIENT)
Dept: LAB | Age: 60
End: 2025-06-21
Attending: INTERNAL MEDICINE
Payer: COMMERCIAL

## 2025-06-21 DIAGNOSIS — R20.0 NUMBNESS AND TINGLING: ICD-10-CM

## 2025-06-21 DIAGNOSIS — E55.9 VITAMIN D DEFICIENCY: ICD-10-CM

## 2025-06-21 DIAGNOSIS — E53.9 BURNING FEET SYNDROME: Primary | ICD-10-CM

## 2025-06-21 DIAGNOSIS — R20.2 NUMBNESS AND TINGLING: ICD-10-CM

## 2025-06-21 DIAGNOSIS — Z00.00 PHYSICAL EXAM, ANNUAL: ICD-10-CM

## 2025-06-21 LAB
ALBUMIN SERPL-MCNC: 4.4 G/DL (ref 3.2–4.8)
ALBUMIN/GLOB SERPL: 1.4 {RATIO} (ref 1–2)
ALP LIVER SERPL-CCNC: 98 U/L (ref 50–130)
ALT SERPL-CCNC: 70 U/L (ref 10–49)
ANION GAP SERPL CALC-SCNC: 9 MMOL/L (ref 0–18)
AST SERPL-CCNC: 55 U/L (ref ?–34)
BILIRUB SERPL-MCNC: 0.5 MG/DL (ref 0.2–1.1)
BUN BLD-MCNC: 12 MG/DL (ref 9–23)
BUN/CREAT SERPL: 18.2 (ref 10–20)
CALCIUM BLD-MCNC: 9.4 MG/DL (ref 8.7–10.4)
CHLORIDE SERPL-SCNC: 106 MMOL/L (ref 98–112)
CHOLEST SERPL-MCNC: 169 MG/DL (ref ?–200)
CO2 SERPL-SCNC: 26 MMOL/L (ref 21–32)
CREAT BLD-MCNC: 0.66 MG/DL (ref 0.55–1.02)
DEPRECATED RDW RBC AUTO: 42.3 FL (ref 35.1–46.3)
EGFRCR SERPLBLD CKD-EPI 2021: 100 ML/MIN/1.73M2 (ref 60–?)
ERYTHROCYTE [DISTWIDTH] IN BLOOD BY AUTOMATED COUNT: 12.2 % (ref 11–15)
EST. AVERAGE GLUCOSE BLD GHB EST-MCNC: 137 MG/DL (ref 68–126)
FASTING PATIENT LIPID ANSWER: YES
FASTING STATUS PATIENT QL REPORTED: YES
FOLATE SERPL-MCNC: 16.5 NG/ML (ref 5.4–?)
GLOBULIN PLAS-MCNC: 3.2 G/DL (ref 2–3.5)
GLUCOSE BLD-MCNC: 119 MG/DL (ref 70–99)
HBA1C MFR BLD: 6.4 % (ref ?–5.7)
HCT VFR BLD AUTO: 39.6 % (ref 35–48)
HDLC SERPL-MCNC: 62 MG/DL (ref 40–59)
HGB BLD-MCNC: 13.4 G/DL (ref 12–16)
LDLC SERPL CALC-MCNC: 96 MG/DL (ref ?–100)
MCH RBC QN AUTO: 31.4 PG (ref 26–34)
MCHC RBC AUTO-ENTMCNC: 33.8 G/DL (ref 31–37)
MCV RBC AUTO: 92.7 FL (ref 80–100)
NONHDLC SERPL-MCNC: 107 MG/DL (ref ?–130)
OSMOLALITY SERPL CALC.SUM OF ELEC: 293 MOSM/KG (ref 275–295)
PLATELET # BLD AUTO: 176 10(3)UL (ref 150–450)
POTASSIUM SERPL-SCNC: 4 MMOL/L (ref 3.5–5.1)
PROT SERPL-MCNC: 7.6 G/DL (ref 5.7–8.2)
RBC # BLD AUTO: 4.27 X10(6)UL (ref 3.8–5.3)
SODIUM SERPL-SCNC: 141 MMOL/L (ref 136–145)
TRIGL SERPL-MCNC: 55 MG/DL (ref 30–149)
TSI SER-ACNC: 1.55 UIU/ML (ref 0.55–4.78)
VIT B12 SERPL-MCNC: 457 PG/ML (ref 211–911)
VIT D+METAB SERPL-MCNC: 50.2 NG/ML (ref 30–100)
VLDLC SERPL CALC-MCNC: 9 MG/DL (ref 0–30)
WBC # BLD AUTO: 3.6 X10(3) UL (ref 4–11)

## 2025-06-21 PROCEDURE — 84425 ASSAY OF VITAMIN B-1: CPT

## 2025-06-21 PROCEDURE — 36415 COLL VENOUS BLD VENIPUNCTURE: CPT | Performed by: INTERNAL MEDICINE

## 2025-06-21 PROCEDURE — 82306 VITAMIN D 25 HYDROXY: CPT

## 2025-06-21 PROCEDURE — 84252 ASSAY OF VITAMIN B-2: CPT

## 2025-06-21 PROCEDURE — 82746 ASSAY OF FOLIC ACID SERUM: CPT | Performed by: INTERNAL MEDICINE

## 2025-06-21 PROCEDURE — 80053 COMPREHEN METABOLIC PANEL: CPT | Performed by: INTERNAL MEDICINE

## 2025-06-21 PROCEDURE — 82607 VITAMIN B-12: CPT | Performed by: INTERNAL MEDICINE

## 2025-06-21 PROCEDURE — 85027 COMPLETE CBC AUTOMATED: CPT | Performed by: INTERNAL MEDICINE

## 2025-06-21 PROCEDURE — 80061 LIPID PANEL: CPT | Performed by: INTERNAL MEDICINE

## 2025-06-21 PROCEDURE — 83036 HEMOGLOBIN GLYCOSYLATED A1C: CPT | Performed by: INTERNAL MEDICINE

## 2025-06-21 PROCEDURE — 84443 ASSAY THYROID STIM HORMONE: CPT | Performed by: INTERNAL MEDICINE

## 2025-06-25 LAB — VITAMIN B1 WHOLE BLD: 67.7 NMOL/L

## 2025-07-01 LAB — VITAMIN B2 WHL BLD: 199 UG/L

## 2025-07-14 ENCOUNTER — RESULTS FOLLOW-UP (OUTPATIENT)
Dept: LAB | Age: 60
End: 2025-07-14

## 2025-08-14 ENCOUNTER — HOSPITAL ENCOUNTER (OUTPATIENT)
Dept: ULTRASOUND IMAGING | Age: 60
Discharge: HOME | End: 2025-08-14
Attending: INTERNAL MEDICINE

## 2025-08-14 DIAGNOSIS — R74.8 ELEVATED LIVER ENZYMES: ICD-10-CM

## 2025-08-14 PROCEDURE — 76705 ECHO EXAM OF ABDOMEN: CPT | Performed by: INTERNAL MEDICINE

## 2025-08-26 ENCOUNTER — LAB ENCOUNTER (OUTPATIENT)
Dept: LAB | Age: 60
End: 2025-08-26
Attending: INTERNAL MEDICINE

## 2025-08-26 ENCOUNTER — TELEPHONE (OUTPATIENT)
Dept: INTERNAL MEDICINE CLINIC | Facility: CLINIC | Age: 60
End: 2025-08-26

## 2025-08-26 PROCEDURE — 80074 ACUTE HEPATITIS PANEL: CPT | Performed by: INTERNAL MEDICINE

## 2025-08-26 PROCEDURE — 36415 COLL VENOUS BLD VENIPUNCTURE: CPT | Performed by: INTERNAL MEDICINE

## 2025-08-27 ENCOUNTER — OFFICE VISIT (OUTPATIENT)
Dept: INTERNAL MEDICINE CLINIC | Facility: CLINIC | Age: 60
End: 2025-08-27

## 2025-08-27 VITALS
WEIGHT: 160 LBS | SYSTOLIC BLOOD PRESSURE: 140 MMHG | HEART RATE: 75 BPM | HEIGHT: 62 IN | OXYGEN SATURATION: 97 % | BODY MASS INDEX: 29.44 KG/M2 | DIASTOLIC BLOOD PRESSURE: 80 MMHG

## 2025-08-27 DIAGNOSIS — R20.0 NUMBNESS AND TINGLING IN RIGHT HAND: ICD-10-CM

## 2025-08-27 DIAGNOSIS — R20.2 NUMBNESS AND TINGLING IN RIGHT HAND: ICD-10-CM

## 2025-08-27 DIAGNOSIS — R73.03 PREDIABETES: ICD-10-CM

## 2025-08-27 DIAGNOSIS — M25.561 ACUTE PAIN OF RIGHT KNEE: Primary | ICD-10-CM

## 2025-08-27 PROCEDURE — 99214 OFFICE O/P EST MOD 30 MIN: CPT | Performed by: INTERNAL MEDICINE

## 2025-08-27 RX ORDER — MELOXICAM 7.5 MG/1
7.5 TABLET ORAL DAILY
Qty: 30 TABLET | Refills: 0 | Status: SHIPPED | OUTPATIENT
Start: 2025-08-27

## 2025-08-27 RX ORDER — PANTOPRAZOLE SODIUM 40 MG/1
40 TABLET, DELAYED RELEASE ORAL
Qty: 30 TABLET | Refills: 0 | Status: SHIPPED | OUTPATIENT
Start: 2025-08-27

## 2025-08-28 ENCOUNTER — HOSPITAL ENCOUNTER (OUTPATIENT)
Dept: GENERAL RADIOLOGY | Age: 60
Discharge: HOME | End: 2025-08-28
Attending: INTERNAL MEDICINE

## 2025-08-28 DIAGNOSIS — M25.561 ACUTE PAIN OF RIGHT KNEE: ICD-10-CM

## 2025-08-28 PROCEDURE — 73564 X-RAY EXAM KNEE 4 OR MORE: CPT | Performed by: INTERNAL MEDICINE

## (undated) NOTE — LETTER
12/28/2020          To Whom It May Concern:    Mikhail Medina is currently under my medical care and may not return to work at this time. Please excuse Shobha Phillip for 1 weeks. She may return to work on 1/6/2021.   Activity is restricted as follows: no lifting

## (undated) NOTE — LETTER
September 14, 2018     [address]  [address] [facility]    Dear Chayo Nunez:    Below are the results from your recent visit:  Resulted Orders   COMP METABOLIC PANEL (14)   Result Value Ref Range    Glucose 87 70 - 99 mg/dL    Sodium 137 136 - Eosinophil Absolute 0.2 0.0 - 0.7 K/UL    Basophil Absolute 0.0 0.0 - 0.2 K/UL     The blood cell count is normal. There is no evidence of anemia or infection. The blood sugar (glucose) level is normal. There is no evidence of diabetes.  The electrolyte

## (undated) NOTE — LETTER
12/19/2020          To Whom It May Concern:    Nellie Escobar is currently under my medical care and may not return to work at this time. Please excuse Anali Clark for 3 days. She may return to work on 12/28/2020. Activity is restricted as follows: none.

## (undated) NOTE — LETTER
[address], [place]  Authorization for Surgical Operation or Procedure  1.  I hereby authorize  ____ , my physician and the assistant, to perform the following operation and/or procedure:  [facility] procedures to be performed for the purposes of advancing medicine, science, and/or education, provided my identity is not revealed. If the procedure has been videotaped, the physician/surgeon will obtain the original videotape.  The hospital will not be res to the proposed treatment. I have also explained the risks and benefits involved in the refusal of the proposed treatment and have answered the patient's questions.  If I have a significant financial interest in a co-management agreement or a significant fi

## (undated) NOTE — MR AVS SNAPSHOT
[address]  [address]  [phone]               Thank you for choosing us for your health care visit with Emmy Epperson DPM.  We are glad to serve you and happy to provide yo

## (undated) NOTE — LETTER
1/26/2021              Lissett Sprain        [address] [facility] [address]         To Whom It May Concern,    Lissett Benavidezain is under my medical care. This is to follow-up on her restrictions at work.   Please amend her lifting restrictions t

## (undated) NOTE — LETTER
01/16/20        [place] [address]      Dear Jerry Brasher,    [address] [facility] records indicate that you have outstanding lab work and or testing that was ordered for you and has not yet been completed:  Orders Placed This Encounter       Mammo

## (undated) NOTE — LETTER
8/12/2020              Jaci Ugalde        [address] [place] [address]         Dear Joyce Vaughan records indicate that the tests ordered for you by Lg Waldrop MD  have not been done.   If you have, in fact, already completed the tests or y

## (undated) NOTE — LETTER
July 14, 2025     Lisa Banerjee  [address] [place] [address]      Dear Lisa:    Below are the results from your recent visit:    Your test results are within normal limits.  Please follow-up with our office if you have any questions or concerns.     Thank you.     Isha Banerjee MD     Resulted Orders   Vitamin B1 (Thiamine), Blood   Result Value Ref Range    Vitamin B1 Whole Bld 67.7 66.5 - 200.0 nmol/L    Narrative    Test(s) 121188-Vit. B1, Whole Blood  was developed and its performance characteristics determined  by [employer]. It has not been cleared or approved by the Food  and Drug Administration.  Performed at:  [address]  [id number]  : Lissett Rodarte MD, Phone:  [phone]   Vitamin B2   Result Value Ref Range    Vitamin B2 Whl Bld 199 137 - 370 ug/L    Narrative    Test(s) 123221-Vitamin B2, Whole Blood  was developed and its performance characteristics determined  by [employer]. It has not been cleared or approved by the Food  and Drug Administration.  Performed at:  [address]  [id number]  : Lissett Rodarte MD, Phone:  [phone]   Vitamin D, 25-Hydroxy   Result Value Ref Range    Vitamin D, 25OH, Total 50.2 30.0 - 100.0 ng/mL

## (undated) NOTE — MR AVS SNAPSHOT
[address], [address]  [address] [facility]  [phone]               Thank you for choosing us for your health care visit with Jayla De Jesus MD.  We are glad to serve you and happy to provide you with this summar Gastro Referral - In Network    Complete by:  As directed    Assoc Dx:  Screening for colon cancer [Z12.11]                 Scheduling Instructions     Wednesday March 22, 2017     Imaging:  EPI SCREENING BILAT (VMJ=[id number])    Instructions:   To schedule a authorization numbers or be assured that none are required. You can then schedule your appointment. Failure to obtain required authorization numbers can create reimbursement difficulties for you.       Assoc Dx:  Screening for colon cancer [Z12.11]        P numbers can create reimbursement difficulties for you.     Assoc Dx:  Nail ingrowing [L60.0]          Reason for Today's Visit     Physical           Medical Issues Discussed Today     Routine general medical examination at a health care facility    -  Prim Make half your plate fruits and vegetables Highly refined, white starches including white bread, rice and pasta   Eat plenty of protein, keep the fat content low Sugars:  sodas and sports drinks, candies and desserts   Eat plenty of low-fat dairy products

## (undated) NOTE — Clinical Note
March 29, 2017         Luciano Orosco MD  [address] [facility]      Patient: Beena Parks   YOB: 1965   Date of Visit: 3/29/2017       Dear Dr. Sandy Koehler MD,    I saw your patient, Beena Parks, on 3/29/2017.  En